# Patient Record
Sex: FEMALE | Race: WHITE | NOT HISPANIC OR LATINO | Employment: OTHER | URBAN - METROPOLITAN AREA
[De-identification: names, ages, dates, MRNs, and addresses within clinical notes are randomized per-mention and may not be internally consistent; named-entity substitution may affect disease eponyms.]

---

## 2020-08-03 ENCOUNTER — OFFICE VISIT (OUTPATIENT)
Dept: OBSTETRICS AND GYNECOLOGY | Facility: CLINIC | Age: 65
End: 2020-08-03
Payer: COMMERCIAL

## 2020-08-03 VITALS — WEIGHT: 150 LBS | SYSTOLIC BLOOD PRESSURE: 138 MMHG | DIASTOLIC BLOOD PRESSURE: 81 MMHG | HEART RATE: 52 BPM

## 2020-08-03 DIAGNOSIS — Z12.31 BREAST CANCER SCREENING BY MAMMOGRAM: ICD-10-CM

## 2020-08-03 DIAGNOSIS — Z01.419 WELL WOMAN EXAM: Primary | ICD-10-CM

## 2020-08-03 PROCEDURE — 99396 PREV VISIT EST AGE 40-64: CPT | Mod: 25,S$GLB,, | Performed by: OBSTETRICS & GYNECOLOGY

## 2020-08-03 PROCEDURE — 82274 ASSAY TEST FOR BLOOD FECAL: CPT | Mod: QW,S$GLB,, | Performed by: OBSTETRICS & GYNECOLOGY

## 2020-08-03 PROCEDURE — 82274 PR  BLOOD,OCCULT,FECAL HGB,FECES,1-3 SIMULT: ICD-10-PCS | Mod: QW,S$GLB,, | Performed by: OBSTETRICS & GYNECOLOGY

## 2020-08-03 PROCEDURE — 99396 PR PREVENTIVE VISIT,EST,40-64: ICD-10-PCS | Mod: 25,S$GLB,, | Performed by: OBSTETRICS & GYNECOLOGY

## 2020-08-03 RX ORDER — ATORVASTATIN CALCIUM 20 MG/1
TABLET, FILM COATED ORAL
COMMUNITY
Start: 2020-07-23 | End: 2022-08-09

## 2020-08-03 RX ORDER — ESCITALOPRAM OXALATE 20 MG/1
TABLET ORAL
COMMUNITY
Start: 2020-07-21 | End: 2021-08-09 | Stop reason: SDUPTHER

## 2020-08-03 RX ORDER — CALCIUM CITRATE/VITAMIN D3 200MG-6.25
TABLET ORAL
COMMUNITY
Start: 2020-07-25

## 2020-08-03 RX ORDER — CLOBETASOL PROPIONATE 0.5 MG/G
OINTMENT TOPICAL
COMMUNITY
Start: 2018-09-25 | End: 2022-12-29

## 2020-08-03 RX ORDER — SITAGLIPTIN AND METFORMIN HYDROCHLORIDE 1000; 50 MG/1; MG/1
TABLET, FILM COATED, EXTENDED RELEASE ORAL
COMMUNITY
Start: 2018-10-27 | End: 2022-08-09

## 2020-08-03 RX ORDER — DICYCLOMINE HYDROCHLORIDE 20 MG/1
TABLET ORAL
COMMUNITY
Start: 2020-07-14 | End: 2022-08-09

## 2020-08-03 RX ORDER — GLIMEPIRIDE 2 MG/1
TABLET ORAL
COMMUNITY
Start: 2020-07-14 | End: 2022-08-09

## 2020-08-03 RX ORDER — ESTRADIOL AND NORETHINDRONE ACETATE 1; .5 MG/1; MG/1
TABLET ORAL
COMMUNITY
Start: 2020-06-10 | End: 2021-08-09

## 2020-08-03 RX ORDER — ESTRADIOL 0.5 MG/1
0.5 TABLET ORAL DAILY
Qty: 90 TABLET | Refills: 3 | Status: SHIPPED | OUTPATIENT
Start: 2020-08-03 | End: 2021-07-14

## 2020-08-03 RX ORDER — PROGESTERONE 100 MG/1
100 CAPSULE ORAL NIGHTLY
Qty: 90 CAPSULE | Refills: 3 | Status: SHIPPED | OUTPATIENT
Start: 2020-08-03 | End: 2021-07-14

## 2020-08-03 RX ORDER — LEVOTHYROXINE SODIUM 75 UG/1
TABLET ORAL
COMMUNITY
Start: 2020-04-27 | End: 2022-08-09

## 2021-05-12 ENCOUNTER — PATIENT MESSAGE (OUTPATIENT)
Dept: RESEARCH | Facility: HOSPITAL | Age: 66
End: 2021-05-12

## 2021-08-09 ENCOUNTER — TELEPHONE (OUTPATIENT)
Dept: OBSTETRICS AND GYNECOLOGY | Facility: CLINIC | Age: 66
End: 2021-08-09

## 2021-08-09 ENCOUNTER — OFFICE VISIT (OUTPATIENT)
Dept: OBSTETRICS AND GYNECOLOGY | Facility: CLINIC | Age: 66
End: 2021-08-09
Payer: MEDICARE

## 2021-08-09 VITALS
DIASTOLIC BLOOD PRESSURE: 92 MMHG | HEIGHT: 65 IN | SYSTOLIC BLOOD PRESSURE: 169 MMHG | HEART RATE: 52 BPM | BODY MASS INDEX: 24.83 KG/M2 | WEIGHT: 149 LBS

## 2021-08-09 DIAGNOSIS — Z01.419 WELL WOMAN EXAM WITH ROUTINE GYNECOLOGICAL EXAM: Primary | ICD-10-CM

## 2021-08-09 DIAGNOSIS — Z12.31 BREAST CANCER SCREENING BY MAMMOGRAM: ICD-10-CM

## 2021-08-09 DIAGNOSIS — N39.41 URGENCY INCONTINENCE: ICD-10-CM

## 2021-08-09 PROCEDURE — G0101 PR CA SCREEN;PELVIC/BREAST EXAM: ICD-10-PCS | Mod: S$GLB,,, | Performed by: OBSTETRICS & GYNECOLOGY

## 2021-08-09 PROCEDURE — 82274 ASSAY TEST FOR BLOOD FECAL: CPT | Mod: QW,S$GLB,, | Performed by: OBSTETRICS & GYNECOLOGY

## 2021-08-09 PROCEDURE — G0101 CA SCREEN;PELVIC/BREAST EXAM: HCPCS | Mod: S$GLB,,, | Performed by: OBSTETRICS & GYNECOLOGY

## 2021-08-09 PROCEDURE — 82274 PR  BLOOD,OCCULT,FECAL HGB,FECES,1-3 SIMULT: ICD-10-PCS | Mod: QW,S$GLB,, | Performed by: OBSTETRICS & GYNECOLOGY

## 2021-08-09 RX ORDER — ESTRADIOL 0.5 MG/1
0.5 TABLET ORAL DAILY
Qty: 90 TABLET | Refills: 3 | Status: SHIPPED | OUTPATIENT
Start: 2021-08-09 | End: 2022-12-29

## 2021-08-09 RX ORDER — PROGESTERONE 100 MG/1
100 CAPSULE ORAL NIGHTLY
Qty: 90 CAPSULE | Refills: 3 | Status: SHIPPED | OUTPATIENT
Start: 2021-08-09 | End: 2022-08-09

## 2021-08-09 RX ORDER — MIRABEGRON 50 MG/1
50 TABLET, FILM COATED, EXTENDED RELEASE ORAL DAILY
Qty: 90 TABLET | Refills: 3 | Status: SHIPPED | OUTPATIENT
Start: 2021-08-09 | End: 2021-08-11 | Stop reason: SDUPTHER

## 2021-08-09 RX ORDER — ESCITALOPRAM OXALATE 20 MG/1
20 TABLET ORAL DAILY
Qty: 90 TABLET | Refills: 3 | Status: SHIPPED | OUTPATIENT
Start: 2021-08-09

## 2021-08-11 ENCOUNTER — PATIENT MESSAGE (OUTPATIENT)
Dept: OBSTETRICS AND GYNECOLOGY | Facility: CLINIC | Age: 66
End: 2021-08-11

## 2021-08-11 DIAGNOSIS — N39.41 URGENCY INCONTINENCE: Primary | ICD-10-CM

## 2021-08-11 RX ORDER — MIRABEGRON 50 MG/1
50 TABLET, FILM COATED, EXTENDED RELEASE ORAL DAILY
Qty: 90 TABLET | Refills: 3 | Status: SHIPPED | OUTPATIENT
Start: 2021-08-11 | End: 2022-08-09

## 2021-09-02 ENCOUNTER — PATIENT MESSAGE (OUTPATIENT)
Dept: OBSTETRICS AND GYNECOLOGY | Facility: CLINIC | Age: 66
End: 2021-09-02

## 2021-09-03 ENCOUNTER — PATIENT MESSAGE (OUTPATIENT)
Dept: OBSTETRICS AND GYNECOLOGY | Facility: CLINIC | Age: 66
End: 2021-09-03

## 2021-09-03 ENCOUNTER — TELEPHONE (OUTPATIENT)
Dept: OBSTETRICS AND GYNECOLOGY | Facility: CLINIC | Age: 66
End: 2021-09-03

## 2021-09-07 ENCOUNTER — TELEPHONE (OUTPATIENT)
Dept: OBSTETRICS AND GYNECOLOGY | Facility: CLINIC | Age: 66
End: 2021-09-07

## 2021-09-07 ENCOUNTER — PATIENT MESSAGE (OUTPATIENT)
Dept: OBSTETRICS AND GYNECOLOGY | Facility: CLINIC | Age: 66
End: 2021-09-07

## 2021-09-07 DIAGNOSIS — N39.41 URGENCY INCONTINENCE: Primary | ICD-10-CM

## 2021-09-22 ENCOUNTER — PATIENT MESSAGE (OUTPATIENT)
Dept: OBSTETRICS AND GYNECOLOGY | Facility: CLINIC | Age: 66
End: 2021-09-22

## 2021-09-22 DIAGNOSIS — N39.41 URGENCY INCONTINENCE: Primary | ICD-10-CM

## 2021-09-22 RX ORDER — OXYBUTYNIN CHLORIDE 5 MG/1
5 TABLET, EXTENDED RELEASE ORAL DAILY
Qty: 30 TABLET | Refills: 11 | Status: SHIPPED | OUTPATIENT
Start: 2021-09-22 | End: 2022-08-09

## 2021-11-20 ENCOUNTER — PATIENT MESSAGE (OUTPATIENT)
Dept: OBSTETRICS AND GYNECOLOGY | Facility: CLINIC | Age: 66
End: 2021-11-20
Payer: MEDICARE

## 2021-11-24 DIAGNOSIS — N39.41 URGENCY INCONTINENCE: Primary | ICD-10-CM

## 2021-11-24 RX ORDER — SOLIFENACIN SUCCINATE 10 MG/1
10 TABLET, FILM COATED ORAL DAILY
Qty: 30 TABLET | Refills: 2 | Status: SHIPPED | OUTPATIENT
Start: 2021-11-24 | End: 2022-08-09

## 2022-07-29 ENCOUNTER — TELEPHONE (OUTPATIENT)
Dept: GASTROENTEROLOGY | Facility: CLINIC | Age: 67
End: 2022-07-29
Payer: MEDICARE

## 2022-07-29 NOTE — TELEPHONE ENCOUNTER
----- Message from Nilda Oliver MA sent at 7/28/2022  3:11 PM CDT -----    ----- Message -----  From: Chen Nevarez  Sent: 7/28/2022   1:48 PM CDT  To: Tae LEBLANC Staff    Pt is calling in regards to seeing if Zofran and Phenergan can be called in to the pharmacy. Pt states that she was admitted in the hospital in [place] and was release today.       [facility] in [place]   [phone](P)      Pt can be reached at [phone] (home)

## 2022-07-29 NOTE — TELEPHONE ENCOUNTER
Dr. Strong typically does not prescribe phenergan. She is scheduled for an appointment 08/09.  Will send to Dr. Strong for recommendations/approval, but she may want to see the records from the hospital in [place].  VAMSHI, CMA

## 2022-08-01 NOTE — TELEPHONE ENCOUNTER
I will need to see/evaluate the patient and her records. Have her bring all of those records with her to her OV with me.  CHINO

## 2022-08-09 ENCOUNTER — OFFICE VISIT (OUTPATIENT)
Dept: GASTROENTEROLOGY | Facility: CLINIC | Age: 67
End: 2022-08-09
Payer: MEDICARE

## 2022-08-09 VITALS
DIASTOLIC BLOOD PRESSURE: 74 MMHG | HEART RATE: 74 BPM | BODY MASS INDEX: 22.66 KG/M2 | SYSTOLIC BLOOD PRESSURE: 117 MMHG | WEIGHT: 136 LBS | HEIGHT: 65 IN

## 2022-08-09 DIAGNOSIS — K58.0 IRRITABLE BOWEL SYNDROME WITH DIARRHEA: ICD-10-CM

## 2022-08-09 DIAGNOSIS — R19.7 DIARRHEA, UNSPECIFIED TYPE: Primary | ICD-10-CM

## 2022-08-09 DIAGNOSIS — R93.3 ABNORMAL FINDING ON GI TRACT IMAGING: ICD-10-CM

## 2022-08-09 DIAGNOSIS — R74.01 ELEVATED AST (SGOT): ICD-10-CM

## 2022-08-09 DIAGNOSIS — K92.1 BLOOD IN STOOL: ICD-10-CM

## 2022-08-09 DIAGNOSIS — D72.829 LEUKOCYTOSIS, UNSPECIFIED TYPE: ICD-10-CM

## 2022-08-09 PROCEDURE — 99215 OFFICE O/P EST HI 40 MIN: CPT | Mod: S$GLB,,, | Performed by: INTERNAL MEDICINE

## 2022-08-09 PROCEDURE — 99215 PR OFFICE/OUTPT VISIT, EST, LEVL V, 40-54 MIN: ICD-10-PCS | Mod: S$GLB,,, | Performed by: INTERNAL MEDICINE

## 2022-08-09 RX ORDER — LEVOTHYROXINE SODIUM 75 UG/1
75 TABLET ORAL
COMMUNITY
Start: 2022-07-18 | End: 2023-08-09

## 2022-08-09 RX ORDER — ATORVASTATIN CALCIUM 20 MG/1
40 TABLET, FILM COATED ORAL
COMMUNITY
Start: 2022-07-05 | End: 2022-12-29

## 2022-08-09 RX ORDER — DAPAGLIFLOZIN 10 MG/1
TABLET, FILM COATED ORAL
COMMUNITY
Start: 2022-05-04

## 2022-08-09 RX ORDER — SITAGLIPTIN AND METFORMIN HYDROCHLORIDE 1000; 50 MG/1; MG/1
1 TABLET, FILM COATED, EXTENDED RELEASE ORAL 2 TIMES DAILY
COMMUNITY
Start: 2022-07-05

## 2022-08-09 RX ORDER — GLIMEPIRIDE 4 MG/1
4 TABLET ORAL 2 TIMES DAILY
COMMUNITY
Start: 2022-04-14

## 2022-08-09 RX ORDER — PROGESTERONE 100 MG/1
100 CAPSULE ORAL
COMMUNITY
Start: 2022-07-17 | End: 2022-12-29

## 2022-08-09 RX ORDER — SOD SULF/POT CHLORIDE/MAG SULF 1.479 G
12 TABLET ORAL DAILY
Qty: 24 TABLET | Refills: 0 | Status: SHIPPED | OUTPATIENT
Start: 2022-08-09 | End: 2022-10-17

## 2022-08-09 RX ORDER — DICYCLOMINE HYDROCHLORIDE 20 MG/1
20 TABLET ORAL
COMMUNITY
Start: 2022-06-16 | End: 2022-11-10 | Stop reason: SDUPTHER

## 2022-08-09 RX ORDER — MULTIVITAMIN
1 TABLET ORAL
COMMUNITY

## 2022-08-09 RX ORDER — LANOLIN ALCOHOL/MO/W.PET/CERES
1000 CREAM (GRAM) TOPICAL
COMMUNITY
End: 2022-12-29

## 2022-08-09 NOTE — LETTER
August 9, 2022        Andrew Meneses MD  [facility] [address] [address]             [place] [facility]  [address] [place] [address]  Phone: [phone]  Fax: [phone]   Patient: Luzmaria Grossman   MR Number: [id number]   YOB: 1955   Date of Visit: 8/9/2022       Dear Dr. Meneses:    Thank you for referring Luzmaria Grossman to me for evaluation. Attached you will find relevant portions of my assessment and plan of care.    If you have questions, please do not hesitate to call me. I look forward to following Luzmaria Grossman along with you.    Sincerely,      Lizzeth Strong MD            CC  No Recipients    Enclosure

## 2022-08-09 NOTE — PATIENT INSTRUCTIONS
Schedule colonoscopy.  Get me a copy of your recent blood tests.  Notify me sooner if your symptoms worsen.  You may liberate your diet.    Please notify my office if you have not been contacted within two weeks after any procedures, submitting any samples (biopsies, blood, stool, urine, etc.) or after any imaging (X-ray, CT, MRI, etc.).

## 2022-08-09 NOTE — PROGRESS NOTES
Clinic Note    Reason for visit:  The primary encounter diagnosis was Diarrhea, unspecified type. Diagnoses of Irritable bowel syndrome with diarrhea, Blood in stool, Leukocytosis, unspecified type, Elevated AST (SGOT), and Abnormal finding on GI tract imaging were also pertinent to this visit.    PCP: Andrew Meneses       HPI:  This is a 66 y.o. female who is being seen for a follow up visit.     Was in [place] to help with her mother when she had acute onset vomiting and diarrhea.  She went to an urgent care was found to have low glucose at 64. She was sent to Emergency Department with a did blood work and a CT scan.  She had mildly elevated white blood cell count that was mostly neutrophils.  Her AST was minimally elevated in just above normal range.  She did not have a bowel movement when she was in the emergency department for the stool orders.  She had a CT scan of the abdomen pelvis with IV contrast that showed some sub mucosal thickening of the ascending colon and fluid-filled appendix.  They favored inflammatory versus infectious source.  She was put on a low residue diet.  She will have 3-4 bowel movements in the morning and take Kaopectate which will help with the diarrhea.  No more vomiting.  She is seeing some blood in her stool.  Medium red blood with no clots appreciated.  Wanted a burger last night.  She does get some lower mid abdominal cramping prior to the bowel movements and now primarily in the morning given the above.    Last colonoscopy 10/3/2017: mild diverticulosis of whole colon, IH, repeat colonoscopy 2024      Change in bowel habits: no coffee, walks in the morning. May be at her new normal with pelvic floor weakness/dysfunction. Bentyl 20 BID. Fat in stool --> stopped oil supp. Creon samples did not help. Shoulder PT referral by PCP then can use pelvic PT. Failed BA binder in the past.   10/14/2014      Chronic diarrhea     Irritable bowel syndrome with diarrhea: trial of Bentyl did  not help much, rifaximin #1 helped greatly, lost effect after 4 months.       Screening for malignant neoplasms of colon: average risk, next due 2024    Review of Systems   Constitutional: Negative for chills, diaphoresis, fatigue, fever and unexpected weight change.   HENT: Negative for mouth sores, nosebleeds, postnasal drip, sore throat, trouble swallowing and voice change.    Eyes: Negative for pain, discharge and eye dryness.   Respiratory: Negative for apnea, cough, choking, chest tightness, shortness of breath and wheezing.    Cardiovascular: Negative for chest pain, palpitations, leg swelling and claudication.   Gastrointestinal: Positive for abdominal pain, blood in stool, change in bowel habit, diarrhea, nausea, vomiting, fecal incontinence and change in bowel habit. Negative for abdominal distention, anal bleeding, constipation, rectal pain and reflux.   Genitourinary: Negative for bladder incontinence, difficulty urinating, dysuria, flank pain, frequency and hematuria.   Musculoskeletal: Negative for arthralgias, back pain, joint swelling and joint deformity.   Integumentary:  Negative for color change, rash and wound.   Allergic/Immunologic: Negative for environmental allergies and food allergies.   Neurological: Negative for seizures, facial asymmetry, speech difficulty, weakness, headaches and memory loss.   Hematological: Negative for adenopathy. Does not bruise/bleed easily.   Psychiatric/Behavioral: Negative for agitation, behavioral problems, confusion, hallucinations and sleep disturbance.      Past Medical History:   Diagnosis Date    At high risk for breast cancer     Diabetes mellitus     History of migraine headaches     History of UTI     Hormone replacement therapy     Hypercholesterolemia     Hypothyroid     Left ovarian cyst     Menopause     Osteopenia     PMB (postmenopausal bleeding)     HANS (stress urinary incontinence, female)     Urinary retention      Past Surgical  History:   Procedure Laterality Date    DILATION AND CURETTAGE OF UTERUS      PTERYGIUM Left     TONSILLECTOMY AND ADENOIDECTOMY      TUBAL LIGATION       Family History   Problem Relation Age of Onset    Breast cancer Mother     Hypertension Father     Breast cancer Other     No Known Problems Sister     No Known Problems Brother     No Known Problems Maternal Grandmother     No Known Problems Maternal Grandfather     No Known Problems Paternal Grandmother     No Known Problems Paternal Grandfather      Social History     Tobacco Use    Smoking status: Former Smoker    Smokeless tobacco: Never Used    Tobacco comment: STOP SMOKING AT AGE 30    Substance Use Topics    Alcohol use: Yes     Comment: OCASSIONAL     Drug use: Never     Review of patient's allergies indicates:  No Known Allergies     Medication List with Changes/Refills   New Medications    SOD SULF-POT CHLORIDE-MAG SULF (SUTAB) 1.479-0.188- 0.225 GRAM TABLET    Take 12 tablets by mouth once daily. Take according to package instructions with indicated amount of water. No breakfast day before test. May substitute with Suprep, Clenpiq, Plenvu, Moviprep or GoLytely based on Rx plan and patient preference.   Current Medications    ATORVASTATIN (LIPITOR) 20 MG TABLET    Take 20 mg by mouth.    CALCIUM-VITAMIN D 600 MG-10 MCG (400 UNIT) TAB    Take 1 tablet by mouth.    CLOBETASOL 0.05% (TEMOVATE) 0.05 % OINT    clobetasol 0.05 % topical ointment    CYANOCOBALAMIN (VITAMIN B-12) 1000 MCG TABLET    Take 1,000 mcg by mouth.    DAPAGLIFLOZIN (FARXIGA) 10 MG TABLET        DICYCLOMINE (BENTYL) 20 MG TABLET    Take 20 mg by mouth.    ESCITALOPRAM OXALATE (LEXAPRO) 20 MG TABLET    Take 1 tablet (20 mg total) by mouth once daily.    ESTRADIOL (ESTRACE) 0.5 MG TABLET    Take 1 tablet (0.5 mg total) by mouth once daily.    GLIMEPIRIDE (AMARYL) 4 MG TABLET    Take 4 mg by mouth 2 (two) times daily.    LEVOTHYROXINE (SYNTHROID) 75 MCG TABLET    Take 75  "mcg by mouth.    PROGESTERONE (PROMETRIUM) 100 MG CAPSULE    Take 100 mg by mouth.    SITAGLIPTAN-METFORMIN (JANUMET XR) 50-1,000 MG TM24    Take 1 tablet by mouth 2 (two) times daily.    TRUE METRIX GLUCOSE TEST STRIP STRP       Discontinued Medications    ATORVASTATIN (LIPITOR) 20 MG TABLET        DICYCLOMINE (BENTYL) 20 MG TABLET        GLIMEPIRIDE (AMARYL) 2 MG TABLET        LEVOTHYROXINE (SYNTHROID) 75 MCG TABLET        MIRABEGRON (MYRBETRIQ) 50 MG TB24    Take 1 tablet (50 mg total) by mouth once daily.    OXYBUTYNIN (DITROPAN-XL) 5 MG TR24    Take 1 tablet (5 mg total) by mouth once daily.    PROGESTERONE (PROMETRIUM) 100 MG CAPSULE    Take 1 capsule (100 mg total) by mouth every evening.    SITAGLIPTAN-METFORMIN (JANUMET XR) 50-1,000 MG TM24    Janumet XR 50 mg-1,000 mg tablet,extended release    SOLIFENACIN (VESICARE) 10 MG TABLET    Take 1 tablet (10 mg total) by mouth once daily.         Vital Signs:  /74   Pulse 74   Ht 5' 5" (1.651 m)   Wt 61.7 kg (136 lb)   BMI 22.63 kg/m²         Physical Exam  Vitals reviewed.   Constitutional:       General: She is awake. She is not in acute distress.     Appearance: Normal appearance. She is well-developed. She is not ill-appearing, toxic-appearing or diaphoretic.   HENT:      Head: Normocephalic and atraumatic.      Nose: Nose normal.      Mouth/Throat:      Mouth: Mucous membranes are moist.      Pharynx: Oropharynx is clear. No oropharyngeal exudate or posterior oropharyngeal erythema.   Eyes:      General: Lids are normal. Gaze aligned appropriately. No scleral icterus.        Right eye: No discharge.         Left eye: No discharge.      Extraocular Movements: Extraocular movements intact.      Conjunctiva/sclera: Conjunctivae normal.   Neck:      Trachea: Trachea normal.   Cardiovascular:      Rate and Rhythm: Normal rate and regular rhythm.      Pulses:           Radial pulses are 2+ on the right side and 2+ on the left side.   Pulmonary:      " Effort: Pulmonary effort is normal. No respiratory distress.      Breath sounds: Normal breath sounds. No stridor. No wheezing or rhonchi.   Chest:      Chest wall: No tenderness.   Abdominal:      General: Bowel sounds are normal. There is no distension.      Palpations: Abdomen is soft. There is no fluid wave, hepatomegaly or mass.      Tenderness: There is no abdominal tenderness. There is no guarding or rebound.   Musculoskeletal:         General: No tenderness or deformity.      Cervical back: Full passive range of motion without pain and neck supple. No tenderness.      Right lower leg: No edema.      Left lower leg: No edema.   Lymphadenopathy:      Cervical: No cervical adenopathy.   Skin:     General: Skin is warm and dry.      Capillary Refill: Capillary refill takes less than 2 seconds.      Coloration: Skin is not cyanotic, jaundiced or pale.      Findings: No rash.   Neurological:      General: No focal deficit present.      Mental Status: She is alert and oriented to person, place, and time.      Cranial Nerves: No facial asymmetry.      Motor: No tremor.   Psychiatric:         Attention and Perception: Attention normal.         Mood and Affect: Mood and affect normal.         Speech: Speech normal.         Behavior: Behavior normal. Behavior is cooperative.            All of the data above and below has been reviewed by myself and any further interpretations will be reflected in the assessment and plan.   The data includes review of external notes, and independent interpretation of lab results, procedures, x-rays, and imaging reports.      Assessment:  Diarrhea, unspecified type  -     Cancel: Ambulatory Referral to External Surgery  -     Ambulatory Referral to External Surgery    Irritable bowel syndrome with diarrhea  -     Cancel: Ambulatory Referral to External Surgery    Blood in stool  -     Cancel: Ambulatory Referral to External Surgery  -     Ambulatory Referral to External  Surgery    Leukocytosis, unspecified type    Elevated AST (SGOT)    Abnormal finding on GI tract imaging  -     Ambulatory Referral to External Surgery    Other orders  -     sod sulf-pot chloride-mag sulf (SUTAB) 1.479-0.188- 0.225 gram tablet; Take 12 tablets by mouth once daily. Take according to package instructions with indicated amount of water. No breakfast day before test. May substitute with Suprep, Clenpiq, Plenvu, Moviprep or GoLytely based on Rx plan and patient preference.  Dispense: 24 tablet; Refill: 0      Her abnormal blood test for likely related to her acute process at that time.  It sounds as if she had a gastroenteritis/colitis that was primarily infectious.  Liberate her diet as tolerate food.  She had recent blood test and she will get me a copy of those results.  If the CBC and LFT were not checked on those and we will order.  In the meantime we will plan to schedule and non urgent colonoscopy with the goal of allowing her GI tract to heal completely long she continues to clinically improve.  I suspect she has residual GI symptoms related to her baseline irritable bowel syndrome.    Recommendations:  Schedule colonoscopy.  Get me a copy of your recent blood tests.  Notify me sooner if your symptoms worsen.  You may liberate your diet.    Risks, benefits, and alternatives of medical management, any associated procedures, and/or treatment discussed with the patient. Patient given opportunity to ask questions and voices understanding. Patient has elected to proceed with the recommended care modalities as discussed.    Follow up in about 1 year (around 8/9/2023).    Order summary:  Orders Placed This Encounter   Procedures    Ambulatory Referral to External Surgery        Instructed patient to notify my office if they have not been contacted within two weeks after any procedures, submitting any samples (biopsies, blood, stool, urine, etc.) or after any imaging (X-ray, CT, MRI, etc.).     Lizzeth  MD Tae    This document may have been created using a voice recognition transcribing system. Incorrect words or phrases may have been missed during proofreading. Please interpret accordingly or contact me for clarification.

## 2022-08-12 ENCOUNTER — TELEPHONE (OUTPATIENT)
Dept: GASTROENTEROLOGY | Facility: CLINIC | Age: 67
End: 2022-08-12
Payer: MEDICARE

## 2022-08-12 NOTE — TELEPHONE ENCOUNTER
----- Message from Mikaela Mullen sent at 8/12/2022  8:46 AM CDT -----  Regarding: pt advice  Contact: Pt  Pt is calling to verify that lab results were received that she had dropped off. Please call back at [phone]//thank you acc

## 2022-08-19 ENCOUNTER — TELEPHONE (OUTPATIENT)
Dept: GASTROENTEROLOGY | Facility: CLINIC | Age: 67
End: 2022-08-19
Payer: MEDICARE

## 2022-08-19 NOTE — TELEPHONE ENCOUNTER
8/4/2022 CMP wnl, 7.92/11.2L/427H, MCV 91. AST 28.4.    Notify patient that I reviewed her lab results from a few weeks ago. Her liver tests were normal. She was mildly anemic as she was prior to that but it is stable. No significant changes in her plan. Add anemia to her history.  NBP

## 2022-08-19 NOTE — TELEPHONE ENCOUNTER
Results and recommendations discussed with patient, who voices understanding and agreement. They will contact us with any issues.   PRITIL, ILYA

## 2022-09-26 ENCOUNTER — OFFICE VISIT (OUTPATIENT)
Dept: OBSTETRICS AND GYNECOLOGY | Facility: CLINIC | Age: 67
End: 2022-09-26
Payer: MEDICARE

## 2022-09-26 VITALS
BODY MASS INDEX: 22.63 KG/M2 | HEART RATE: 80 BPM | SYSTOLIC BLOOD PRESSURE: 122 MMHG | WEIGHT: 136 LBS | DIASTOLIC BLOOD PRESSURE: 77 MMHG

## 2022-09-26 DIAGNOSIS — Z12.31 BREAST CANCER SCREENING BY MAMMOGRAM: ICD-10-CM

## 2022-09-26 DIAGNOSIS — Z01.419 WELL WOMAN EXAM WITH ROUTINE GYNECOLOGICAL EXAM: Primary | ICD-10-CM

## 2022-09-26 PROCEDURE — G0101 CA SCREEN;PELVIC/BREAST EXAM: HCPCS | Mod: S$GLB,,, | Performed by: OBSTETRICS & GYNECOLOGY

## 2022-09-26 PROCEDURE — G0101 PR CA SCREEN;PELVIC/BREAST EXAM: ICD-10-PCS | Mod: S$GLB,,, | Performed by: OBSTETRICS & GYNECOLOGY

## 2022-09-27 LAB — Lab: NORMAL

## 2022-09-27 NOTE — PROGRESS NOTES
Subjective:       Patient ID: Luzmaria Grossman is a 67 y.o. female.    Chief Complaint:  Well Woman      History of Present Illness  She is doing well.  No new health issues,  No abnormal bleeding, No GI or Gu concerns,  No dyspareunia.   Her  had MI   she too has a CA score that is high   she is to have a cardiology apt.   She is having difficulty with intercourse and desire      no vag bleeding   HPI    Outpatient Medications Marked as Taking for the 22 encounter (Office Visit) with Oscar Lundberg III, MD   Medication Sig Dispense Refill    atorvastatin (LIPITOR) 20 MG tablet Take 40 mg by mouth.      calcium-vitamin D 600 mg-10 mcg (400 unit) Tab Take 1 tablet by mouth.      clobetasol 0.05% (TEMOVATE) 0.05 % Oint clobetasol 0.05 % topical ointment      cyanocobalamin (VITAMIN B-12) 1000 MCG tablet Take 1,000 mcg by mouth.      dapagliflozin (FARXIGA) 10 mg tablet       dicyclomine (BENTYL) 20 mg tablet Take 20 mg by mouth.      EScitalopram oxalate (LEXAPRO) 20 MG tablet Take 1 tablet (20 mg total) by mouth once daily. 90 tablet 3    estradioL (ESTRACE) 0.5 MG tablet Take 1 tablet (0.5 mg total) by mouth once daily. 90 tablet 3    glimepiride (AMARYL) 4 MG tablet Take 4 mg by mouth 2 (two) times daily.      levothyroxine (SYNTHROID) 75 MCG tablet Take 75 mcg by mouth.      progesterone (PROMETRIUM) 100 MG capsule Take 100 mg by mouth.      SITagliptan-metformin (JANUMET XR) 50-1,000 mg TM24 Take 1 tablet by mouth 2 (two) times daily.        GYN & OB History  No LMP recorded. Patient is postmenopausal.     Date of Last Pap: No result found    OB History    Para Term  AB Living   4 2           SAB IAB Ectopic Multiple Live Births                  # Outcome Date GA Lbr Eric/2nd Weight Sex Delivery Anes PTL Lv   4             3             2 Para            1 Para                Review of Systems  Review of Systems   Constitutional:  Negative for activity change.   Eyes:   Negative for visual disturbance.   Respiratory:  Negative for shortness of breath.    Cardiovascular:  Negative for chest pain.   Gastrointestinal:  Negative for abdominal pain.   Genitourinary:  Negative for vaginal bleeding.        No abnormal vaginal bleeding   Musculoskeletal:  Negative for back pain.   Integumentary:  Negative for rash and breast mass.   Neurological:  Negative for numbness.   Psychiatric/Behavioral:          No mood disturbance or changes    Breast: Negative for mass          Objective:    Physical Exam:   Constitutional: She is oriented to person, place, and time. She appears well-developed. She is cooperative.    HENT:   Head: Normocephalic.     Neck: Trachea normal. No thyromegaly present.    Cardiovascular:  Normal rate, regular rhythm and normal heart sounds.             Pulmonary/Chest: Effort normal and breath sounds normal. Right breast exhibits no mass, no nipple discharge and no skin change. Left breast exhibits no mass, no nipple discharge and no skin change.   Bilateral fibrocystic changes  During the exam self breast exams discussed         Abdominal: Soft. There is no abdominal tenderness. There is no rebound and no guarding.     Genitourinary:    Vagina, uterus and rectum normal.   Rectum:      Guaiac result negative.   Guaiac negative stool.    Pelvic exam was performed with patient supine.   Labial bartholins normal.There is no lesion on the right labia. There is no lesion on the left labia. Cervix is normal. Right adnexum displays no mass and no tenderness. Left adnexum displays no mass and no tenderness. Cervix exhibits no discharge. Also,  pap smear completed  Uterus is not enlarged and not tender.              Lymphadenopathy:        Head (right side): No submental and no submandibular adenopathy present.        Head (left side): No submental and no submandibular adenopathy present.     She has no cervical adenopathy.    Neurological: She is alert and oriented to person,  place, and time.    Skin: Skin is warm.    Psychiatric: She has a normal mood and affect. Her speech is normal and behavior is normal. Thought content normal.        Assessment:        1. Well woman exam with routine gynecological exam    2. Breast cancer screening by mammogram               Plan:         Discussed that HRT can increase the risk of cardiovascular events and CVA.    She will start an ASA per day.   Would consider stopping her HRT  if needed will try non hormonal meds.   She is aware  of meds to help with desire    bonified meds discussed to help that are non hormonal     Pap   Mammogram  Follow up one year or sooner if needed  Self breast exams  Consider health profile if labs not done with PCP  Recommend colonoscopy as indicated  Bone density discussed   Pt is aware we call all results. If she does not hear from our office regarding her result within a week of having a study or procedure performed she is to call the office so that we can research the result for her as I do not know when she is scheduling her procedures.   Chaperone was present

## 2022-10-10 ENCOUNTER — TELEPHONE (OUTPATIENT)
Dept: GASTROENTEROLOGY | Facility: CLINIC | Age: 67
End: 2022-10-10
Payer: MEDICARE

## 2022-10-10 NOTE — TELEPHONE ENCOUNTER
----- Message from Mikaela Mullen sent at 10/10/2022  9:06 AM CDT -----  Regarding: pt advice  Contact: pt  Pt states that she has questions regarding her prep. Please call back at [phone]//thank you acc

## 2022-10-12 ENCOUNTER — OUTSIDE PLACE OF SERVICE (OUTPATIENT)
Dept: GASTROENTEROLOGY | Facility: CLINIC | Age: 67
End: 2022-10-12

## 2022-10-12 LAB — CRC RECOMMENDATION EXT: NORMAL

## 2022-10-12 PROCEDURE — 45380 COLONOSCOPY AND BIOPSY: CPT | Mod: ,,, | Performed by: INTERNAL MEDICINE

## 2022-10-12 PROCEDURE — 45380 PR COLONOSCOPY,BIOPSY: ICD-10-PCS | Mod: ,,, | Performed by: INTERNAL MEDICINE

## 2022-10-17 ENCOUNTER — TELEPHONE (OUTPATIENT)
Dept: GASTROENTEROLOGY | Facility: CLINIC | Age: 67
End: 2022-10-17
Payer: MEDICARE

## 2022-10-17 NOTE — TELEPHONE ENCOUNTER
CBx incr'd IEL.  Notify patient that her colon Bx look well. Keep follow up OV with me and notify me sooner if any issues.  NBP

## 2022-10-18 ENCOUNTER — PATIENT MESSAGE (OUTPATIENT)
Dept: GASTROENTEROLOGY | Facility: CLINIC | Age: 67
End: 2022-10-18
Payer: MEDICARE

## 2022-10-19 NOTE — TELEPHONE ENCOUNTER
Pt returned call and given pepto instructions of taking 2 chewable tablets BID.  I will contact her in 2 weeks to get an update on her symptoms with the pepto.

## 2022-10-19 NOTE — TELEPHONE ENCOUNTER
See patient message from 10/18/2022 encounter.  No signs of microscopic colitis on her colon Bx. There is a mild increase in some inflammation cells but not enough to be classified as microscopic colitis.  Some people find relief of loose stool with treating as if they have microscopic colitis which can started with Peptobismol (OTC, generic okay) 2 chewables starting BID. Stool may turn harmlessly black.  Send me an update in 2 weeks. May need to up to up QID. Typically an eight week course total then trial of stopping it.  NBP

## 2022-10-19 NOTE — TELEPHONE ENCOUNTER
LVM to call us back.    See patient message from 10/18/2022 encounter.  No signs of microscopic colitis on her colon Bx. There is a mild increase in some inflammation cells but not enough to be classified as microscopic colitis.  Some people find relief of loose stool with treating as if they have microscopic colitis which can started with Peptobismol (OTC, generic okay) 2 chewables starting BID. Stool may turn harmlessly black.  Send me an update in 2 weeks. May need to up to up QID. Typically an eight week course total then trial of stopping it.  NBP

## 2022-11-03 ENCOUNTER — TELEPHONE (OUTPATIENT)
Dept: GASTROENTEROLOGY | Facility: CLINIC | Age: 67
End: 2022-11-03
Payer: MEDICARE

## 2022-11-03 NOTE — TELEPHONE ENCOUNTER
I spoke with pt to get an update from her on Pepto use 2 tabs BID. Pt states that she is still having daily loose stools. I instructed pt to increase to 2 tabs QID and I will call her in a few weeks to get an update from her.

## 2022-11-03 NOTE — TELEPHONE ENCOUNTER
----- Message from Yeimy East sent at 11/3/2022  8:49 AM CDT -----  Regarding: Medication  Contact: Patient  Per phone call with patient, she would like for Diana to give her a call back regarding she spoke to you 2 weeks ago and was advised to take Pepso twice a day and you will call her back and tell her what is next.  Please return call at [phone] (home).     Thanks,  NORMA      Type:  RX Refill Request    Who Called: Luzmaria   Refill or New Rx: refill   RX Name and Strength: dicyclomine (BENTYL) 20 mg tablet  How is the patient currently taking it? (ex. 1XDay):daily   Is this a 30 day or 90 day RX: 30  Preferred Pharmacy with phone number:   [facility] [address] [place], [place] - [address] [address]  Phone: [phone] Fax: [phone]    local or Mail Order:local   Ordering Provider: Dr Strong   Would the patient rather a call back or a response via [facility]?  Call back   Best Call Back Number:[phone] (home)     Additional Information:     NORMA Kerr

## 2022-11-08 NOTE — TELEPHONE ENCOUNTER
----- Message from Yeimy Jack sent at 11/8/2022 10:34 AM CST -----  Regarding: Refill  Contact: patient  Type:  RX Refill Request    Who Called: Luzmaria   Refill or New Rx: refill   RX Name and Strength: dicyclomine (BENTYL) 20 mg tablet  How is the patient currently taking it? (ex. 1XDay):daily   Is this a 30 day or 90 day RX: 90  Preferred Pharmacy with phone number:  [facility] [address] [place], [place] - [address] [address]  Phone: [phone] Fax: [phone]    Local or Mail Order:local   Ordering Provider: Dr Strong   Would the patient rather a call back or a response via [facility]?  [facility]  Best Call Back Number: [phone] (home)     Additional Information: The pharmacy has faxed over information    NORMA Kerr

## 2022-11-09 ENCOUNTER — PATIENT MESSAGE (OUTPATIENT)
Dept: GASTROENTEROLOGY | Facility: CLINIC | Age: 67
End: 2022-11-09
Payer: MEDICARE

## 2022-11-11 RX ORDER — DICYCLOMINE HYDROCHLORIDE 20 MG/1
20 TABLET ORAL 4 TIMES DAILY PRN
Qty: 120 TABLET | Refills: 2 | OUTPATIENT
Start: 2022-11-11

## 2022-11-11 RX ORDER — DICYCLOMINE HYDROCHLORIDE 20 MG/1
20 TABLET ORAL 2 TIMES DAILY PRN
Qty: 180 TABLET | Refills: 3 | Status: SHIPPED | OUTPATIENT
Start: 2022-11-11 | End: 2023-10-20

## 2022-12-09 ENCOUNTER — TELEPHONE (OUTPATIENT)
Dept: GASTROENTEROLOGY | Facility: CLINIC | Age: 67
End: 2022-12-09
Payer: MEDICARE

## 2022-12-09 DIAGNOSIS — D64.9 ANEMIA, UNSPECIFIED TYPE: ICD-10-CM

## 2022-12-09 DIAGNOSIS — K90.9 INTESTINAL MALABSORPTION, UNSPECIFIED TYPE: ICD-10-CM

## 2022-12-09 DIAGNOSIS — R19.7 DIARRHEA, UNSPECIFIED TYPE: Primary | ICD-10-CM

## 2022-12-09 NOTE — TELEPHONE ENCOUNTER
Pt called to report that she is in week 8 of taking pepto  2 tabs QID and still is having issues.  Pt states that she is still experiencing fecal incontinence.   Pt wants to know what the next steps are.

## 2022-12-09 NOTE — TELEPHONE ENCOUNTER
----- Message from Shannon Layton RN sent at 12/9/2022 10:08 AM CST -----  Regarding: FW: Pepto Pills  Contact: patient    ----- Message -----  From: Yeimy Jack  Sent: 12/9/2022   9:49 AM CST  To: Tae [facility]BLANC Staff  Subject: Pepto Pills                                      Per phone call with patient, she stated that this is her 8 weeks to be on the pepto pills and she wanted to know the next step.  Please return call at [phone] (home).    Thanks,  SJ

## 2022-12-11 NOTE — TELEPHONE ENCOUNTER
Stop the Peptobismol. Let us get some blood and stool tests completed given the persistence of her diarrhea.  She also had mild anemia and I will send a work up on that as well. She should notify me if no word with the results within two weeks of submitting the blood and stool samples.  CHINO

## 2022-12-13 ENCOUNTER — DOCUMENTATION ONLY (OUTPATIENT)
Dept: GASTROENTEROLOGY | Facility: CLINIC | Age: 67
End: 2022-12-13
Payer: MEDICARE

## 2022-12-13 LAB
CRYPTOSPORIDIUM DNA: NOT DETECTED
GIARDIA LAMBLIA DNA: NOT DETECTED

## 2022-12-17 LAB
%TRANSFERRIN SATURATION: 4 % (ref 20–50)
ABS NRBC COUNT: 0 X 10 3/UL (ref 0–0.01)
ABSOLUTE BASOPHIL: 0.13 X 10 3/UL (ref 0–0.22)
ABSOLUTE EOSINOPHIL: 0.3 X 10 3/UL (ref 0.04–0.54)
ABSOLUTE IMMATURE GRAN: 0.03 X 10 3/UL (ref 0–0.04)
ABSOLUTE LYMPHOCYTE: 2.65 X 10 3/UL (ref 0.86–4.75)
ABSOLUTE MONOCYTE: 0.62 X 10 3/UL (ref 0.22–1.08)
ABSOLUTE RETIC: 0.09 X 10 6/UL (ref 0.02–0.08)
ADDITIONAL TESTING: NORMAL
ALBUMIN SERPL-MCNC: 4.8 G/DL (ref 3.5–5.2)
ALBUMIN/GLOB SERPL ELPH: 1.7 {RATIO} (ref 1–2.7)
ALP ISOS SERPL LEV INH-CCNC: 111 U/L (ref 35–105)
ALT (SGPT): 25 U/L (ref 0–33)
ANION GAP SERPL CALC-SCNC: 14 MMOL/L (ref 8–17)
AST SERPL-CCNC: 24 U/L (ref 0–32)
B12: 2502 PG/ML (ref 232–1245)
BASOPHILS NFR BLD: 1.3 % (ref 0.2–1.2)
BILIRUBIN, TOTAL: 0.25 MG/DL (ref 0–1.2)
BUN/CREAT SERPL: 28.7 (ref 6–20)
CALCIUM SERPL-MCNC: 10.4 MG/DL (ref 8.6–10.2)
CARBON DIOXIDE, CO2: 27 MMOL/L (ref 22–29)
CHLORIDE: 103 MMOL/L (ref 98–107)
CREAT SERPL-MCNC: 0.63 MG/DL (ref 0.5–0.9)
CRP QUALITATIVE: NEGATIVE MG/L
CRP QUANTITATIVE: <3 MG/L
EOSINOPHIL NFR BLD: 3.1 % (ref 0.7–7)
FERRITIN: 6.16 NG/ML (ref 20–288)
FOLATE SERPL-MCNC: 17.7 NG/ML (ref 5.6–45.8)
GFR ESTIMATION: 91.77
GLOBULIN: 2.9 G/DL (ref 1.5–4.5)
GLUCOSE: 132 MG/DL (ref 82–115)
HCT VFR BLD AUTO: 37.8 % (ref 37–47)
HEMOGLOBIN A1: 97.9 % (ref 96–99)
HEMOGLOBIN A2: 2.1 % (ref 1.5–3.5)
HGB BLD-MCNC: 11.7 G/DL (ref 12–16)
HGB FRACT BLD-IMP: NORMAL
IGA SERPL-MCNC: 194 MG/DL (ref 70–400)
IMMATURE GRANULOCYTES: 0.3 % (ref 0–0.5)
IRON BINDING CAPACITY: 374 UG/DL (ref 262–472)
IRON SERPL-MCNC: 15 UG/DL (ref 37–145)
LDH SERPL L TO P-CCNC: 174 U/L (ref 135–214)
LYMPHOCYTES NFR BLD: 27.4 % (ref 19.3–53.1)
MCH RBC QN AUTO: 28.1 PG (ref 27–32)
MCHC RBC AUTO-ENTMCNC: 31 G/DL (ref 32–36)
MCV RBC AUTO: 90.6 FL (ref 82–100)
MONOCYTES NFR BLD: 6.4 % (ref 4.7–12.5)
NEUTROPHILS # BLD AUTO: 5.93 X 10 3/UL (ref 2.15–7.56)
NEUTROPHILS NFR BLD: 61.5 % (ref 34–71.1)
NUCLEATED RED BLOOD CELLS: 0 /100 WBC (ref 0–0.2)
PLATELET # BLD AUTO: 391 X 10 3/UL (ref 135–400)
POTASSIUM: 4.3 MMOL/L (ref 3.5–5.1)
PROT SNV-MCNC: 7.7 G/DL (ref 6.4–8.3)
RBC # BLD AUTO: 4.17 X 10 6/UL (ref 4.2–5.4)
RDW-SD: 49.7 FL (ref 37–54)
RETICULOCYTE COUNT: 2.1 % (ref 0.5–1.7)
SED RATE (WESTERGREN): 9 MM/HR (ref 0–30)
SODIUM: 144 MMOL/L (ref 136–145)
TSH SERPL DL<=0.005 MIU/L-ACNC: 0.13 UIU/ML (ref 0.27–4.2)
TTG IGA: 0.4 U/ML
UIBC SERPL-MCNC: 359 UG/DL (ref 112–306)
UREA NITROGEN (BUN): 18.1 MG/DL (ref 8–23)
WBC # BLD: 9.66 X 10 3/UL (ref 4.3–10.8)

## 2022-12-20 LAB
CALPROTECTIN, STOOL: 77 MCG/G
FECAL FAT, QUALITATIVE: NORMAL
PANCREATIC ELASTASE 1: >500 MCG/G

## 2022-12-23 LAB
ENDOMYSIAL IGA SCREEN: NEGATIVE
HAPTOGLOBIN: 168 MG/DL (ref 43–212)
TRANSFERRIN: 326 MG/DL (ref 188–341)

## 2022-12-27 ENCOUNTER — TELEPHONE (OUTPATIENT)
Dept: GASTROENTEROLOGY | Facility: CLINIC | Age: 67
End: 2022-12-27
Payer: MEDICARE

## 2022-12-27 NOTE — TELEPHONE ENCOUNTER
----- Message from Lizzeth Strong MD sent at 12/22/2022  3:51 PM CST -----  Calpro 77B, elast/fat nl.  Giardia neg.  Hb 11.7L, MCV 90.6, CBC onl, ferr 6.16L, Fe 15L, T/LD/B12/fol/Hbelect nl, 4%L, retic 2.1H, AP 111H, CMP onl. CRP/ESR/tTG/IgA nl. TSH 0.13L.    Pending trnsfrn, hapto, endomy, gliadin. Confirm I get them.  NBP

## 2022-12-28 ENCOUNTER — TELEPHONE (OUTPATIENT)
Dept: GASTROENTEROLOGY | Facility: CLINIC | Age: 67
End: 2022-12-28
Payer: MEDICARE

## 2022-12-28 DIAGNOSIS — K58.0 IRRITABLE BOWEL SYNDROME WITH DIARRHEA: ICD-10-CM

## 2022-12-28 DIAGNOSIS — D64.9 ANEMIA, UNSPECIFIED TYPE: ICD-10-CM

## 2022-12-28 DIAGNOSIS — R19.7 DIARRHEA, UNSPECIFIED TYPE: Primary | ICD-10-CM

## 2022-12-29 ENCOUNTER — TELEPHONE (OUTPATIENT)
Dept: GASTROENTEROLOGY | Facility: CLINIC | Age: 67
End: 2022-12-29
Payer: MEDICARE

## 2022-12-29 RX ORDER — ATORVASTATIN CALCIUM 40 MG/1
TABLET, FILM COATED ORAL
COMMUNITY
Start: 2022-09-21

## 2022-12-29 RX ORDER — PROMETHAZINE HYDROCHLORIDE 12.5 MG/1
12.5 TABLET ORAL
COMMUNITY
Start: 2022-09-06 | End: 2023-08-09

## 2022-12-29 RX ORDER — CLOBETASOL PROPIONATE 0.5 MG/G
CREAM TOPICAL
COMMUNITY
Start: 2022-12-19

## 2022-12-29 NOTE — TELEPHONE ENCOUNTER
Endomy/Hapto/Trnsfrn nl.  Calpro 77B, elast/fat nl.  Giardia neg.  Hb 11.7L, MCV 90.6, CBC onl, ferr 6.16L, Fe 15L, T/LD/B12/fol/Hbelect nl, 4%L, retic 2.1H, AP 111H, CMP onl. CRP/ESR/tTG/IgA nl. TSH 0.13L.    TPL denies having the gliadin order.    Notify patient that her stool results looked well. No signs of infection or lack of digestive enzymes from the pancreas.  Her anemia is related to low iron. Her vitamin levels checked are good. No signs of Celiac disease. Her thyroid results was a bit out of range that can be seen with thyroid replacements that are being adjusted.  Send all results to her PHCP with this note. Recommend beginning iron supplementation, OTC iron BID.  May harmlessly turn her stool black. I recommend an upper endoscopy (okay to schedule).  I think it point she may need to be evaluated by a colorectal surgeon ([facility] in [place]) for anal manometry to see if her fecal incontinence is related to her anal sphincter and/or a pelvic floor gynecology evaluation ([facility] in [place]).  Let me know if she agrees to either referral.  NBP

## 2022-12-29 NOTE — TELEPHONE ENCOUNTER
----- Message from Mikaela Mullen sent at 12/29/2022  3:03 PM CST -----  Regarding: test results  Contact: pt  Type:  Test Results    Who Called:  Luzmaria Grossman   Name of Test (Lab/Mammo/Etc):  labs   Date of Test:  12/13/22  Ordering Provider:  Tae   Where the test was performed[facility]   Would the patient rather a call back or a response via [facility]?  Call back   Best Call Back Number:  [phone]  Additional Information:

## 2022-12-29 NOTE — TELEPHONE ENCOUNTER
"[facility]  [address] [place] [address]  Phone: [phone]  Fax: [phone]    History & Physical         Provider: Dr. Lizzeth Strong    Patient Name: Luzmaria HASSAN (age):1955  67 y.o.           Gender: female   Phone: [phone]     Referring Physician: Andrew Meneses     Vital Signs:   Height - 5'5"  Weight - 136 lbs  BMI -  22.7    Plan: EGD            History:      Past Medical History:   Diagnosis Date    Anemia, unspecified     At high risk for breast cancer     BMI 22.0-22.9, adult     Diabetes mellitus     History of migraine headaches     History of UTI     Hormone replacement therapy     Hypercholesterolemia     Hypothyroid     Left ovarian cyst     Menopause     Osteopenia     PMB (postmenopausal bleeding)     HANS (stress urinary incontinence, female)     Urinary retention       Past Surgical History:   Procedure Laterality Date    BLEPHAROPLASTY      COLONOSCOPY      COSMETIC SURGERY  9/8/10    Breast reduction    CYST REMOVAL Left     Ring finger    DILATION AND CURETTAGE OF UTERUS      PTERYGIUM Left     REDUCTION OF BOTH BREASTS Bilateral     thumb surgery Left     trigger finger    tonsilectomy      TUBAL LIGATION      tvto sling        Medication List with Changes/Refills   Current Medications    ATORVASTATIN (LIPITOR) 40 MG TABLET        CALCIUM-VITAMIN D 600 MG-10 MCG (400 UNIT) TAB    Take 1 tablet by mouth.    CLOBETASOL (TEMOVATE) 0.05 % CREAM    APPLY TO RASH ON BODY TWICE DAILY FOR 2 WEEKS ON, 2 WEEKS OFF AND REPEAT FOR 2 WEEKS AS NEEDED FOR FLARED. AVOID USE ON FACE UNDERARMS AND GROIN.    DAPAGLIFLOZIN (FARXIGA) 10 MG TABLET        DICYCLOMINE (BENTYL) 20 MG TABLET    Take 1 tablet (20 mg total) by mouth 2 (two) times daily as needed (abdominal cramping or diarrhea).    ESCITALOPRAM OXALATE (LEXAPRO) 20 MG TABLET    Take 1 tablet (20 mg total) by mouth once " daily.    GLIMEPIRIDE (AMARYL) 4 MG TABLET    Take 4 mg by mouth 2 (two) times daily.    LEVOTHYROXINE (SYNTHROID) 75 MCG TABLET    Take 75 mcg by mouth.    PROMETHAZINE (PHENERGAN) 12.5 MG TAB    Take 12.5 mg by mouth.    SITAGLIPTAN-METFORMIN (JANUMET XR) 50-1,000 MG TM24    Take 1 tablet by mouth 2 (two) times daily.    TRUE METRIX GLUCOSE TEST STRIP STRP       Discontinued Medications    ATORVASTATIN (LIPITOR) 20 MG TABLET    Take 40 mg by mouth.    CLOBETASOL 0.05% (TEMOVATE) 0.05 % OINT    clobetasol 0.05 % topical ointment    CYANOCOBALAMIN (VITAMIN B-12) 1000 MCG TABLET    Take 1,000 mcg by mouth.    ESTRADIOL (ESTRACE) 0.5 MG TABLET    Take 1 tablet (0.5 mg total) by mouth once daily.    PROGESTERONE (PROMETRIUM) 100 MG CAPSULE    Take 100 mg by mouth.      Review of patient's allergies indicates:  No Known Allergies   Family History   Problem Relation Age of Onset    Breast cancer Mother     Atrial fibrillation Mother     Atrial fibrillation Father     Hypertension Father     Diabetes Father     Heart disease Father     No Known Problems Sister     No Known Problems Brother     No Known Problems Maternal Grandmother     No Known Problems Maternal Grandfather     No Known Problems Paternal Grandmother     No Known Problems Paternal Grandfather       Social History     Tobacco Use    Smoking status: Former     Packs/day: 0.00     Years: 10.00     Pack years: 0.00     Types: Cigarettes     Start date: 1975     Quit date: 1986     Years since quittin.4    Smokeless tobacco: Never    Tobacco comments:     STOP SMOKING AT AGE 30    Substance Use Topics    Alcohol use: Yes     Alcohol/week: 2.0 standard drinks     Types: 2 Drinks containing 0.5 oz of alcohol per week     Comment: OCASSIONAL     Drug use: Never        Physical Examination:     General Appearance:___________________________  HEENT:  _____________________________________  Abdomen:____________________________________  Heart:________________________________________  Lungs:_______________________________________  Extremities:___________________________________  Skin:_________________________________________  Endocrine:____________________________________  Genitourinary:_________________________________  Neurological:__________________________________      Patient has been evaluated immediately prior to sedation and is medically cleared for endoscopy with IVCS as an ASA class: ______      Physician Signature: _________________________       Date: ________  Time: ________

## 2022-12-30 NOTE — TELEPHONE ENCOUNTER
I spoke with pt. EGD scheduled.  Pt wanted me to confirm if she should choose both referrals or one? She stated that she doesn't mind doing both if that is what is recommended.

## 2023-01-03 ENCOUNTER — TELEPHONE (OUTPATIENT)
Dept: GASTROENTEROLOGY | Facility: CLINIC | Age: 68
End: 2023-01-03

## 2023-01-03 NOTE — TELEPHONE ENCOUNTER
Ideally the gynecologist would complete the anal manometry as well but I cannot confirm that they do. So I say get both providers' evaluations and opinions. Will sign both referrals and may send both with records (colonoscopy and pathology reports and last OV note).  NBP

## 2023-01-05 ENCOUNTER — TELEPHONE (OUTPATIENT)
Dept: GASTROENTEROLOGY | Facility: CLINIC | Age: 68
End: 2023-01-05
Payer: MEDICARE

## 2023-01-05 NOTE — TELEPHONE ENCOUNTER
----- Message from Betty Meneses sent at 1/5/2023  3:14 PM CST -----  Contact: self  Type - Follow Up     Patient would like to speak w/clinic in regards to referrals that Dr Strong has sent out. May I have you reach out to her @ [phone] - thank you!

## 2023-01-05 NOTE — TELEPHONE ENCOUNTER
Spoke with pt, she was just wanting to see if she could make an appt with Dr. Strong to sit and discuss what she should expect to prepare herself before  she sees the doctors in [place].  She said  if Dr. Strong could just call her that would be fine also.

## 2023-01-06 NOTE — TELEPHONE ENCOUNTER
She may make a next available OV with [facility] or delay her referrals until our OV to discuss. I would recommend she at least meet with those providers as I am not sure what procedures/tests they would recommend. I would suspect an anal manometry to check the function of her rectum and anal sphincter.  The other tests by gynecology I am not familiar with as they are highly specialized which is why I recommended them.  I do know that the local gynecologists also well-respect Dr. [facility]ador's clinic.  NBP

## 2023-01-06 NOTE — TELEPHONE ENCOUNTER
Informed patient, the doctors office called her and scheduled her an appt for the end of the month.  She will keep the appt and see what they say, states she did her research and read patient comments ; she  feels comfortable seeing them . She has a follow up with our office in August.

## 2023-02-24 ENCOUNTER — TELEPHONE (OUTPATIENT)
Dept: GASTROENTEROLOGY | Facility: CLINIC | Age: 68
End: 2023-02-24
Payer: MEDICARE

## 2023-02-24 NOTE — TELEPHONE ENCOUNTER
----- Message from Pattie Eagle sent at 2/24/2023 10:32 AM CST -----  Type:  Needs Medical Advice    Who Called: Luzmaria Grossman    Symptoms (please be specific): -   How long has patient had these symptoms:  -  Pharmacy name and phone #:  -  Would the patient rather a call back or a response via [facility]? CB   Best Call Back Number: [phone]    Additional Information: Pt needas to reschedule EGD, please call

## 2023-03-07 NOTE — TELEPHONE ENCOUNTER
I spoke with pt. She stated that Dr Amador does not take her insurance/Medicare.  She has seen Dr Trinidad and stated that she is having surgery on 3/13/23 to remove her appendix and part of her cecum.  I have requested notes from Dr Trinidad's office.  Pt would like to hold off for now on GYN referral.

## 2023-03-12 NOTE — PROGRESS NOTES
3 Dr. Trinidad OV notes: 2/10/2023 pelvic floor weakness --> PT, appendiceal ?mucocele --> pending cecectomy.  NBP

## 2023-03-22 NOTE — PROGRESS NOTES
Corazon, confirm I get the op/path reports from Dr. Trinidad' office and update [facility].  NBP

## 2023-03-22 NOTE — PROGRESS NOTES
[place]ia, NP for Dr. Trinidad, was messaged directly by me.  The patient had her surgery that was uncomplicated.  Pathology revealed dilated appendix but no cancer or other abnormality.  She is forwarding the pathology results to me.  CHINO

## 2023-04-27 VITALS — WEIGHT: 136 LBS | BODY MASS INDEX: 22.66 KG/M2 | HEIGHT: 65 IN

## 2023-04-27 DIAGNOSIS — D64.9 ANEMIA, UNSPECIFIED TYPE: Primary | ICD-10-CM

## 2023-04-27 NOTE — PROGRESS NOTES
"[facility]  [address] [place] [address]  Phone: [phone]  Fax: [phone]    History & Physical         Provider: Dr. Lizzeth Strong    Patient Name: Luzmaria HASSAN (age):1955  67 y.o.           Gender: female   Phone: [phone]     Referring Physician: Andrew Meneses     Vital Signs:   Height - 5' 5"  Weight - 136 lb  BMI -  22.63    Plan: EGD    Encounter Diagnosis   Name Primary?    Anemia, unspecified type Yes           History:      Past Medical History:   Diagnosis Date    Anemia, unspecified     At high risk for breast cancer     BMI 22.0-22.9, adult     Diabetes mellitus     History of migraine headaches     History of UTI     Hormone replacement therapy     Hypercholesterolemia     Hypothyroid     Left ovarian cyst     Menopause     Osteopenia     PMB (postmenopausal bleeding)     HANS (stress urinary incontinence, female)     Urinary retention       Past Surgical History:   Procedure Laterality Date    BLEPHAROPLASTY      COLONOSCOPY      COSMETIC SURGERY  9/8/10    Breast reduction    CYST REMOVAL Left     Ring finger    DILATION AND CURETTAGE OF UTERUS      PTERYGIUM Left     REDUCTION OF BOTH BREASTS Bilateral     thumb surgery Left     trigger finger    tonsilectomy      TUBAL LIGATION      tvto sling        Medication List with Changes/Refills   Current Medications    ATORVASTATIN (LIPITOR) 40 MG TABLET        CALCIUM-VITAMIN D 600 MG-10 MCG (400 UNIT) TAB    Take 1 tablet by mouth.    CLOBETASOL (TEMOVATE) 0.05 % CREAM    APPLY TO RASH ON BODY TWICE DAILY FOR 2 WEEKS ON, 2 WEEKS OFF AND REPEAT FOR 2 WEEKS AS NEEDED FOR FLARED. AVOID USE ON FACE UNDERARMS AND GROIN.    DAPAGLIFLOZIN (FARXIGA) 10 MG TABLET        DICYCLOMINE (BENTYL) 20 MG TABLET    Take 1 tablet (20 mg total) by mouth 2 (two) times daily as needed (abdominal cramping or diarrhea).    ESCITALOPRAM " OXALATE (LEXAPRO) 20 MG TABLET    Take 1 tablet (20 mg total) by mouth once daily.    GLIMEPIRIDE (AMARYL) 4 MG TABLET    Take 4 mg by mouth 2 (two) times daily.    LEVOTHYROXINE (SYNTHROID) 75 MCG TABLET    Take 75 mcg by mouth.    PROMETHAZINE (PHENERGAN) 12.5 MG TAB    Take 12.5 mg by mouth.    SITAGLIPTAN-METFORMIN (JANUMET XR) 50-1,000 MG TM24    Take 1 tablet by mouth 2 (two) times daily.    TRUE METRIX GLUCOSE TEST STRIP STRP          Review of patient's allergies indicates:  No Known Allergies   Family History   Problem Relation Age of Onset    Breast cancer Mother     Atrial fibrillation Mother     Atrial fibrillation Father     Hypertension Father     Diabetes Father     Heart disease Father     No Known Problems Sister     No Known Problems Brother     No Known Problems Maternal Grandmother     No Known Problems Maternal Grandfather     No Known Problems Paternal Grandmother     No Known Problems Paternal Grandfather       Social History     Tobacco Use    Smoking status: Former     Packs/day: 0.00     Years: 10.00     Pack years: 0.00     Types: Cigarettes     Start date: 1975     Quit date: 1986     Years since quittin.7    Smokeless tobacco: Never    Tobacco comments:     STOP SMOKING AT AGE 30    Substance Use Topics    Alcohol use: Yes     Alcohol/week: 2.0 standard drinks     Types: 2 Drinks containing 0.5 oz of alcohol per week     Comment: OCASSIONAL     Drug use: Never        Physical Examination:     General Appearance:___________________________  HEENT: _____________________________________  Abdomen:____________________________________  Heart:________________________________________  Lungs:_______________________________________  Extremities:___________________________________  Skin:_________________________________________  Endocrine:____________________________________  Genitourinary:_________________________________  Neurological:__________________________________      Patient  has been evaluated immediately prior to sedation and is medically cleared for endoscopy with IVCS as an ASA class: ______      Physician Signature: _________________________       Date: ________  Time: ________

## 2023-05-03 ENCOUNTER — TELEPHONE (OUTPATIENT)
Dept: OBSTETRICS AND GYNECOLOGY | Facility: CLINIC | Age: 68
End: 2023-05-03
Payer: MEDICARE

## 2023-05-03 NOTE — TELEPHONE ENCOUNTER
Patient states she was in for her visit in September and Dr Lundberg took her off her hormones. She was advised to call if she started with any symptoms and she would be placed on a natural supplement. She states she has started to experience hot flashes and would like more information on the supplement. Advised patient that I will check with Dr Lundberg this afternoon when he returns from surgery and see what he recommends. Patient verbalized understanding.

## 2023-05-03 NOTE — TELEPHONE ENCOUNTER
----- Message from Giuliana Vipin sent at 5/2/2023 10:51 AM CDT -----  Contact: Patient  Patient called to consult with nurse or staff regarding her hormones. She states she was taken off her hormones medication in September and states she could call for a natural hormone replacement. She wanted to speak with office regarding recommendations and would like a call back. She can be reached at [phone]. Thanks/

## 2023-05-04 NOTE — TELEPHONE ENCOUNTER
Spoke to patient, advised that Dr Lundberg recommends trying a product called Relizen. Placed samples up front for her to  and try. Advised to call us in a few weeks if she is still having symptoms. Patient verbalized understanding.

## 2023-08-09 ENCOUNTER — TELEPHONE (OUTPATIENT)
Dept: GASTROENTEROLOGY | Facility: CLINIC | Age: 68
End: 2023-08-09

## 2023-08-09 ENCOUNTER — OFFICE VISIT (OUTPATIENT)
Dept: GASTROENTEROLOGY | Facility: CLINIC | Age: 68
End: 2023-08-09
Payer: MEDICARE

## 2023-08-09 VITALS
OXYGEN SATURATION: 90 % | BODY MASS INDEX: 22.19 KG/M2 | HEART RATE: 71 BPM | WEIGHT: 133.19 LBS | HEIGHT: 65 IN | DIASTOLIC BLOOD PRESSURE: 70 MMHG | SYSTOLIC BLOOD PRESSURE: 119 MMHG

## 2023-08-09 DIAGNOSIS — R74.01 ELEVATED AST (SGOT): ICD-10-CM

## 2023-08-09 DIAGNOSIS — K58.0 IRRITABLE BOWEL SYNDROME WITH DIARRHEA: ICD-10-CM

## 2023-08-09 DIAGNOSIS — D64.9 ANEMIA, UNSPECIFIED TYPE: ICD-10-CM

## 2023-08-09 DIAGNOSIS — D50.9 IRON DEFICIENCY ANEMIA, UNSPECIFIED IRON DEFICIENCY ANEMIA TYPE: Primary | ICD-10-CM

## 2023-08-09 DIAGNOSIS — M62.89 PELVIC FLOOR DYSFUNCTION: ICD-10-CM

## 2023-08-09 DIAGNOSIS — R19.7 DIARRHEA, UNSPECIFIED TYPE: ICD-10-CM

## 2023-08-09 PROCEDURE — 99215 OFFICE O/P EST HI 40 MIN: CPT | Mod: S$GLB,,, | Performed by: INTERNAL MEDICINE

## 2023-08-09 PROCEDURE — 99215 PR OFFICE/OUTPT VISIT, EST, LEVL V, 40-54 MIN: ICD-10-PCS | Mod: S$GLB,,, | Performed by: INTERNAL MEDICINE

## 2023-08-09 RX ORDER — DULAGLUTIDE 0.75 MG/.5ML
INJECTION, SOLUTION SUBCUTANEOUS
COMMUNITY
Start: 2023-07-30 | End: 2023-09-28

## 2023-08-09 RX ORDER — LEVOTHYROXINE SODIUM 25 UG/1
25 TABLET ORAL
COMMUNITY
Start: 2023-08-03

## 2023-08-09 RX ORDER — FERROUS SULFATE 325(65) MG
325 TABLET ORAL DAILY
COMMUNITY

## 2023-08-09 NOTE — LETTER
August 9, 2023        Andrew Meneses MD  [address]  [place] [address]             [facility]  [address] [address]  Phone: [phone]  Fax: [phone]   Patient: Luzmaria Grossman   MR Number: [id number]   YOB: 1955   Date of Visit: 8/9/2023       Dear Dr. Meneses:    Thank you for referring Luzmaria Grossman to me for evaluation. Attached you will find relevant portions of my assessment and plan of care.    If you have questions, please do not hesitate to call me. I look forward to following Luzmaria Grossman along with you.    Sincerely,      Lizzeth Strong MD            CC  No Recipients    Enclosure

## 2023-08-09 NOTE — PROGRESS NOTES
Clinic Note    Reason for visit:  The primary encounter diagnosis was Iron deficiency anemia, unspecified iron deficiency anemia type. Diagnoses of Pelvic floor dysfunction, Diarrhea, unspecified type, Irritable bowel syndrome with diarrhea, Anemia, unspecified type, and Elevated AST (SGOT) were also pertinent to this visit.    PCP: Andrew Meneses.       HPI:  This is a 67 y.o. female who is here for a follow up. Patient with increased IEL on colonoscopy 10/2022. She took Pepto 2 tablets QID for 8 weeks and was still having loose stool with fecal incontinence. She also has ARTURO was told to begin taking iron by mouth BID and to schedule EGD. The EGD was scheduled and canceled by patient twice. She was referred to Dr. Trinidad for further evaluation of incontinence. She had anal manometry and found to have pelvic floor dysfunction and Dr. Trinidad recommended pelvic floor therapy and if that does not help, then may be candidate for sacral nerve stimulation or modulation. 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     Discussed EGD and     3/13/2023 cecectomy path: benign    3 Dr. Trinidad OV notes: 2/10/2023 pelvic floor weakness --> PT, appendiceal ?mucocele --> pending cecectomy.     Labs 12/2022: Calpro 77B, elast/fat nl. Giardia neg. Hb 11.7L, MCV 90.6, CBC onl, ferr 6.16L, Fe 15L, T/LD/B12/fol/Hbelect/Hapto/Trnsfrn nl, 4%L, retic 2.1H, AP 111H, CMP onl. CRP/ESR/tTG/IgA/Endomy nl. TSH 0.13L,     Colonoscopy 10/12/2022: Moderate diverticulosis of the sigmoid colon and whole colon. CBx incr'd IEL.    Review of Systems   Constitutional:  Negative for fatigue, fever and unexpected weight change.   HENT:  Negative for mouth sores, postnasal drip, sore throat and trouble swallowing.    Eyes:  Negative for pain, discharge and eye dryness.   Respiratory:  Negative for apnea, cough, choking, chest tightness, shortness of breath and wheezing.    Cardiovascular:  Negative for chest pain, palpitations and leg swelling.    Gastrointestinal:  Positive for fecal incontinence. Negative for abdominal distention, abdominal pain, anal bleeding, blood in stool, change in bowel habit, constipation, diarrhea, nausea, rectal pain, vomiting, reflux and change in bowel habit.   Genitourinary:  Negative for bladder incontinence, dysuria and hematuria.   Musculoskeletal:  Negative for arthralgias, back pain and joint swelling.   Integumentary:  Negative for color change and rash.   Allergic/Immunologic: Negative for environmental allergies and food allergies.   Neurological:  Negative for seizures and headaches.   Hematological:  Negative for adenopathy. Does not bruise/bleed easily.        Past Medical History:   Diagnosis Date    Anemia, unspecified     At high risk for breast cancer     BMI 22.0-22.9, adult     Diabetes mellitus     History of migraine headaches     History of UTI     Hormone replacement therapy     Hypercholesterolemia     Hypothyroid     Left ovarian cyst     Menopause     Osteopenia     PMB (postmenopausal bleeding)     HANS (stress urinary incontinence, female)     Urinary retention      Past Surgical History:   Procedure Laterality Date    BLEPHAROPLASTY      CECECTOMY  03/2023    COLONOSCOPY      COSMETIC SURGERY  9/8/10    Breast reduction    CYST REMOVAL Left     Ring finger    DILATION AND CURETTAGE OF UTERUS      x2    PTERYGIUM Left     REDUCTION OF BOTH BREASTS Bilateral     thumb surgery Left     trigger finger    tonsilectomy      TUBAL LIGATION      tvto sling       Family History   Problem Relation Age of Onset    Breast cancer Mother     Atrial fibrillation Mother     Atrial fibrillation Father     Hypertension Father     Diabetes Father     Heart disease Father     No Known Problems Sister     No Known Problems Brother     No Known Problems Maternal Grandmother     No Known Problems Maternal Grandfather     No Known Problems Paternal Grandmother     No Known Problems Paternal Grandfather      Social History  "    Tobacco Use    Smoking status: Former     Current packs/day: 0.00     Types: Cigarettes     Start date: 1975     Quit date: 1986     Years since quittin.0    Smokeless tobacco: Never    Tobacco comments:     STOP SMOKING AT AGE 30    Substance Use Topics    Alcohol use: Yes     Alcohol/week: 2.0 standard drinks of alcohol     Types: 2 Drinks containing 0.5 oz of alcohol per week     Comment: OCASSIONAL     Drug use: Never     Review of patient's allergies indicates:  No Known Allergies     Medication List with Changes/Refills   Current Medications    ATORVASTATIN (LIPITOR) 40 MG TABLET        CALCIUM-VITAMIN D 600 MG-10 MCG (400 UNIT) TAB    Take 1 tablet by mouth.    CLOBETASOL (TEMOVATE) 0.05 % CREAM    APPLY TO RASH ON BODY TWICE DAILY FOR 2 WEEKS ON, 2 WEEKS OFF AND REPEAT FOR 2 WEEKS AS NEEDED FOR FLARED. AVOID USE ON FACE UNDERARMS AND GROIN.    DAPAGLIFLOZIN (FARXIGA) 10 MG TABLET        DICYCLOMINE (BENTYL) 20 MG TABLET    Take 1 tablet (20 mg total) by mouth 2 (two) times daily as needed (abdominal cramping or diarrhea).    ESCITALOPRAM OXALATE (LEXAPRO) 20 MG TABLET    Take 1 tablet (20 mg total) by mouth once daily.    FERROUS SULFATE (FEOSOL) 325 MG (65 MG IRON) TAB TABLET    Take 325 mg by mouth once daily.    GLIMEPIRIDE (AMARYL) 4 MG TABLET    Take 4 mg by mouth 2 (two) times daily.    LEVOTHYROXINE (SYNTHROID) 25 MCG TABLET    Take 25 mcg by mouth.    POLLEN EXTRACTS (RELIZEN ORAL)        SITAGLIPTAN-METFORMIN (JANUMET XR) 50-1,000 MG TM24    Take 1 tablet by mouth 2 (two) times daily.    TRUE METRIX GLUCOSE TEST STRIP STRP        TRULICITY 0.75 MG/0.5 ML PEN INJECTOR    INJECT THE CONTENTS OF 1 SYRINGE UNDER THE SKIN ONCE A WEEK   Discontinued Medications    LEVOTHYROXINE (SYNTHROID) 75 MCG TABLET    Take 75 mcg by mouth.    PROMETHAZINE (PHENERGAN) 12.5 MG TAB    Take 12.5 mg by mouth.         Vital Signs:  /70   Pulse 71   Ht 5' 5" (1.651 m)   Wt 60.4 kg (133 lb 3.2 oz)  "  SpO2 (!) 90%   BMI 22.17 kg/m²         Physical Exam  Vitals reviewed.   Constitutional:       General: She is awake. She is not in acute distress.     Appearance: Normal appearance. She is well-developed. She is not ill-appearing, toxic-appearing or diaphoretic.   HENT:      Head: Normocephalic and atraumatic.      Nose: Nose normal.      Mouth/Throat:      Mouth: Mucous membranes are moist.      Pharynx: Oropharynx is clear. No oropharyngeal exudate or posterior oropharyngeal erythema.   Eyes:      General: Lids are normal. Gaze aligned appropriately. No scleral icterus.        Right eye: No discharge.         Left eye: No discharge.      Conjunctiva/sclera: Conjunctivae normal.   Neck:      Trachea: Trachea normal.   Cardiovascular:      Rate and Rhythm: Normal rate and regular rhythm.      Pulses:           Radial pulses are 2+ on the right side and 2+ on the left side.   Pulmonary:      Effort: Pulmonary effort is normal. No respiratory distress.      Breath sounds: No stridor. No wheezing.   Chest:      Chest wall: No tenderness.   Abdominal:      General: Bowel sounds are normal. There is no distension.      Palpations: Abdomen is soft. There is no fluid wave, hepatomegaly or mass.      Tenderness: There is no abdominal tenderness. There is no guarding or rebound.   Musculoskeletal:         General: No tenderness or deformity.      Cervical back: Full passive range of motion without pain and neck supple. No tenderness.      Right lower leg: No edema.      Left lower leg: No edema.   Lymphadenopathy:      Cervical: No cervical adenopathy.   Skin:     General: Skin is warm and dry.      Capillary Refill: Capillary refill takes less than 2 seconds.      Coloration: Skin is not cyanotic, jaundiced or pale.   Neurological:      General: No focal deficit present.      Mental Status: She is alert and oriented to person, place, and time.      Motor: No tremor.   Psychiatric:         Attention and Perception:  Attention normal.         Mood and Affect: Mood and affect normal.         Speech: Speech normal.         Behavior: Behavior normal. Behavior is cooperative.            All of the data above and below has been reviewed by myself and any further interpretations will be reflected in the assessment and plan.   The data includes review of external notes, and independent interpretation of lab results, procedures, x-rays, and imaging reports.      Assessment:  Iron deficiency anemia, unspecified iron deficiency anemia type  -     Ambulatory Referral to External Surgery    Pelvic floor dysfunction    Diarrhea, unspecified type  -     Ambulatory Referral to External Surgery    Irritable bowel syndrome with diarrhea    Anemia, unspecified type  -     Ambulatory Referral to External Surgery    Elevated AST (SGOT)    No overt GI blood loss.  Rec EGD with G/D Bx. If unrevealing then plan for CE. Discussed R/B/A.  Get all lab results from [facility] from 2023. May need iron studies if not already done.  Donated blood 1-2 times in 2023. About twice 2022. She will hold on that for now.     Recommendations:  Schedule upper endoscopy.   Continue physical therapy for your pelvic health.  Continue iron pill once daily.    Risks, benefits, and alternatives of medical management, any associated procedures, and/or treatment discussed with the patient. Patient given opportunity to ask questions and voices understanding. Patient has elected to proceed with the recommended care modalities as discussed.    Instructed patient to notify my office if they have not been contacted within two weeks after any procedures, submitting any samples (biopsies, blood, stool, urine, etc.) or after any imaging (X-ray, CT, MRI, etc.).     Follow up in about 1 year (around 8/9/2024).    Order summary:  Orders Placed This Encounter   Procedures    Ambulatory Referral to External Surgery          This document may have been created using a voice recognition  transcribing system. Incorrect words or phrases may have been missed during proofreading. Please interpret accordingly or contact me for clarification.     Lizzeth Strong MD

## 2023-08-09 NOTE — LETTER
August 9, 2023        Andrew Meneses MD  [facility] [address] [address]             [facility]  [address] [address]  Phone: [phone]  Fax: [phone]   Patient: Luzmaria Grossman   MR Number: [id number]   YOB: 1955   Date of Visit: 8/9/2023       Dear Dr. Meneses:    Thank you for referring Luzmaria Grossman to me for evaluation. Attached you will find relevant portions of my assessment and plan of care.    If you have questions, please do not hesitate to call me. I look forward to following Luzmaria Grossman along with you.    Sincerely,      Lizzeth Strong MD            CC  No Recipients    Enclosure

## 2023-08-31 ENCOUNTER — OUTSIDE PLACE OF SERVICE (OUTPATIENT)
Dept: GASTROENTEROLOGY | Facility: CLINIC | Age: 68
End: 2023-08-31

## 2023-08-31 PROCEDURE — 43239 PR EGD, FLEX, W/BIOPSY, SGL/MULTI: ICD-10-PCS | Mod: ,,, | Performed by: INTERNAL MEDICINE

## 2023-08-31 PROCEDURE — 43239 EGD BIOPSY SINGLE/MULTIPLE: CPT | Mod: ,,, | Performed by: INTERNAL MEDICINE

## 2023-09-01 ENCOUNTER — TELEPHONE (OUTPATIENT)
Dept: OBSTETRICS AND GYNECOLOGY | Facility: CLINIC | Age: 68
End: 2023-09-01
Payer: MEDICARE

## 2023-09-01 DIAGNOSIS — F52.0 DECREASED SEXUAL DESIRE: Primary | ICD-10-CM

## 2023-09-01 RX ORDER — FLIBANSERIN 100 MG/1
100 TABLET, FILM COATED ORAL NIGHTLY
Qty: 30 TABLET | Refills: 12 | Status: SHIPPED | OUTPATIENT
Start: 2023-09-01 | End: 2023-09-28 | Stop reason: SDUPTHER

## 2023-09-01 NOTE — TELEPHONE ENCOUNTER
----- Message from Shade Neal sent at 9/1/2023  9:47 AM CDT -----  Contact: Pt  Pt is calling rg wanting to have something to help her decrease in sex drive and can be reached at [phone]/anjelica/mary beth     [facility] [address] [place] [address] [place] - [address]  [address] [facility] [address]  Phone: [phone] Fax: [phone]

## 2023-09-01 NOTE — TELEPHONE ENCOUNTER
Spoke to patient she c/o decreased sexual desire. Advised I will consult with Dr Lundberg and have something called out for her. Patient verbalized understanding.

## 2023-09-01 NOTE — TELEPHONE ENCOUNTER
Patient c/o low sexual desire. Allergies reviewed. Pharmacy up to date. Medication pending. Please approve.

## 2023-09-05 ENCOUNTER — TELEPHONE (OUTPATIENT)
Dept: OBSTETRICS AND GYNECOLOGY | Facility: CLINIC | Age: 68
End: 2023-09-05
Payer: MEDICARE

## 2023-09-05 NOTE — TELEPHONE ENCOUNTER
Spoke to patient and advised that a prescription was sent to pharmacy. Patient verbalized understanding.

## 2023-09-05 NOTE — TELEPHONE ENCOUNTER
----- Message from Shade Neal sent at 9/5/2023  2:44 PM CDT -----  Contact: Pt  Pt is calling rg speaking with Stephanie and wanting to discuss her medication Laureano and can be reached at [phone]//thanks/dbw

## 2023-09-05 NOTE — TELEPHONE ENCOUNTER
Spoke to patient, she states she did some research that showed Addyi was not for post menopausal women. Advised I check with Dr Lundberg and he said it was ok for her to take and to let us know if she has any problems once she starts it. Patient verbalized understanding.

## 2023-09-05 NOTE — TELEPHONE ENCOUNTER
----- Message from Giuliana Lew sent at 9/5/2023 11:43 AM CDT -----  Contact: Patient  Patient called to consult with nurse or staff regarding medication. She states she spoke with clinic about having a prescription called out and patient wanted to check on the status of this. She would like a call back and can be reached at [phone]. Thanks/Mr

## 2023-09-07 ENCOUNTER — TELEPHONE (OUTPATIENT)
Dept: GASTROENTEROLOGY | Facility: CLINIC | Age: 68
End: 2023-09-07

## 2023-09-07 ENCOUNTER — TELEPHONE (OUTPATIENT)
Dept: GASTROENTEROLOGY | Facility: CLINIC | Age: 68
End: 2023-09-07
Payer: MEDICARE

## 2023-09-07 DIAGNOSIS — D50.9 IRON DEFICIENCY ANEMIA, UNSPECIFIED IRON DEFICIENCY ANEMIA TYPE: Primary | ICD-10-CM

## 2023-09-07 NOTE — TELEPHONE ENCOUNTER
DBx nl, Gbx react w/mild chr gitis w/o Hp.    Notify patient. No signs of infection, precancerous cells or Celiac disease on the upper endoscopy biopsies.  Rec CE given her ARTURO.  NBP

## 2023-09-26 ENCOUNTER — OUTSIDE PLACE OF SERVICE (OUTPATIENT)
Dept: GASTROENTEROLOGY | Facility: CLINIC | Age: 68
End: 2023-09-26
Payer: MEDICARE

## 2023-09-26 ENCOUNTER — TELEPHONE (OUTPATIENT)
Dept: GASTROENTEROLOGY | Facility: CLINIC | Age: 68
End: 2023-09-26

## 2023-09-26 PROCEDURE — 91110 GI TRC IMG INTRAL ESOPH-ILE: CPT | Mod: 26,,, | Performed by: INTERNAL MEDICINE

## 2023-09-26 PROCEDURE — 91110 PR GI TRACT CAPSULE ENDOSCOPY: ICD-10-PCS | Mod: 26,,, | Performed by: INTERNAL MEDICINE

## 2023-09-26 NOTE — TELEPHONE ENCOUNTER
Notify patient that her capsule endoscopy results were normal.  We were supposed to get her last set of blood results from [place].  Please get from her primary healthcare provider.  Also send copy of capsule endoscopy report to primary healthcare provider.  CHINO

## 2023-09-27 NOTE — TELEPHONE ENCOUNTER
I spoke to patient and gave results of CE.  She states she saw Dr Sam Meneses after labs were done at [facility] and he said everything was ok except A1C.  The labs were scanned in her chart.  She also is asking if she can donate blood?

## 2023-09-27 NOTE — TELEPHONE ENCOUNTER
LVM asking pt to call our office. Most recent lab results (Aug/2023) pulled from [facility] and scanned into chart.

## 2023-09-28 ENCOUNTER — OFFICE VISIT (OUTPATIENT)
Dept: OBSTETRICS AND GYNECOLOGY | Facility: CLINIC | Age: 68
End: 2023-09-28
Payer: MEDICARE

## 2023-09-28 VITALS
SYSTOLIC BLOOD PRESSURE: 127 MMHG | HEART RATE: 76 BPM | BODY MASS INDEX: 22.63 KG/M2 | DIASTOLIC BLOOD PRESSURE: 77 MMHG | WEIGHT: 136 LBS

## 2023-09-28 DIAGNOSIS — Z12.31 BREAST CANCER SCREENING BY MAMMOGRAM: ICD-10-CM

## 2023-09-28 DIAGNOSIS — Z01.419 WELL WOMAN EXAM WITH ROUTINE GYNECOLOGICAL EXAM: Primary | ICD-10-CM

## 2023-09-28 DIAGNOSIS — F52.0 DECREASED SEXUAL DESIRE: ICD-10-CM

## 2023-09-28 PROCEDURE — G0101 CA SCREEN;PELVIC/BREAST EXAM: HCPCS | Mod: S$GLB,,, | Performed by: OBSTETRICS & GYNECOLOGY

## 2023-09-28 PROCEDURE — G0101 PR CA SCREEN;PELVIC/BREAST EXAM: ICD-10-PCS | Mod: S$GLB,,, | Performed by: OBSTETRICS & GYNECOLOGY

## 2023-09-28 RX ORDER — FLIBANSERIN 100 MG/1
100 TABLET, FILM COATED ORAL NIGHTLY
Qty: 30 TABLET | Refills: 12 | Status: SHIPPED | OUTPATIENT
Start: 2023-09-28

## 2023-09-28 RX ORDER — DULAGLUTIDE 1.5 MG/.5ML
INJECTION, SOLUTION SUBCUTANEOUS
COMMUNITY
Start: 2023-09-05

## 2023-09-28 NOTE — PROGRESS NOTES
Subjective:       Patient ID: Luzmaria Grossman is a 68 y.o. female.    Chief Complaint:  Well Woman      History of Present Illness  She is doing well.  No new health issues,  No abnormal bleeding, No GI or Gu concerns,  No dyspareunia.   She is doing well   her  had an MI last year but he is good.     She is on the lexapro  no probs she desires to stay on it     she is on Addyi but has not noted a benefit yet    Her colonoscopy was done within the year   HPI      GYN & OB History  No LMP recorded. Patient is postmenopausal.     Date of Last Pap: No result found    OB History    Para Term  AB Living   4 2           SAB IAB Ectopic Multiple Live Births                  # Outcome Date GA Lbr Eric/2nd Weight Sex Delivery Anes PTL Lv   4             3             2 Para            1 Para                Review of Systems  Review of Systems   Constitutional:  Negative for activity change.   Eyes:  Negative for visual disturbance.   Respiratory:  Negative for shortness of breath.    Cardiovascular:  Negative for chest pain.   Gastrointestinal:  Negative for abdominal pain.   Genitourinary:  Negative for vaginal bleeding.        No abnormal vaginal bleeding   Musculoskeletal:  Negative for back pain.   Integumentary:  Negative for rash and breast mass.   Neurological:  Negative for numbness.   Psychiatric/Behavioral:          No mood disturbance or changes    Breast: Negative for mass            Objective:    Physical Exam:   Constitutional: She is oriented to person, place, and time. She appears well-developed. She is cooperative.    HENT:   Head: Normocephalic.     Neck: Trachea normal. No thyromegaly present.    Cardiovascular:  Normal rate, regular rhythm and normal heart sounds.             Pulmonary/Chest: Effort normal and breath sounds normal. Right breast exhibits no mass, no nipple discharge and no skin change. Left breast exhibits no mass, no nipple discharge and no skin change.         Abdominal: Soft. There is no abdominal tenderness. There is no rebound and no guarding.     Genitourinary:    Vagina and uterus normal.      Pelvic exam was performed with patient supine.   Labial bartholins normal.There is no lesion on the right labia. There is no lesion on the left labia. Cervix is normal. Right adnexum displays no mass and no tenderness. Left adnexum displays no mass and no tenderness. Cervix exhibits no discharge. Uterus is not enlarged and not tender.              Lymphadenopathy:        Head (right side): No submental and no submandibular adenopathy present.        Head (left side): No submental and no submandibular adenopathy present.     She has no cervical adenopathy.    Neurological: She is alert and oriented to person, place, and time.    Skin: Skin is warm.    Psychiatric: She has a normal mood and affect. Her speech is normal and behavior is normal. Thought content normal.          Assessment:        1. Well woman exam with routine gynecological exam    2. Breast cancer screening by mammogram               Plan:           Discussed Addyi  Pap  not done    Mammogram  Follow up one year or sooner if needed  Self breast exams  Consider health profile if labs not done with PCP  Recommend colonoscopy as indicated  Bone density discussed   Pt is aware we call all results. If she does not hear from our office regarding her result within a week of having a study or procedure performed she is to call the office so that we can research the result for her as I do not know when she is scheduling her procedures.   Chaperone was present

## 2023-10-01 ENCOUNTER — TELEPHONE (OUTPATIENT)
Dept: GASTROENTEROLOGY | Facility: CLINIC | Age: 68
End: 2023-10-01
Payer: MEDICARE

## 2023-10-02 NOTE — TELEPHONE ENCOUNTER
8/29/2023 A1c 8.6H, .4H, CBC onl.    The patient asked about if she may resume blood donations. I would say yes but less often. Begin with every 4 months. Notify me if any issues before her OV with me.  NBP

## 2023-10-04 ENCOUNTER — PATIENT MESSAGE (OUTPATIENT)
Dept: OBSTETRICS AND GYNECOLOGY | Facility: CLINIC | Age: 68
End: 2023-10-04
Payer: MEDICARE

## 2024-01-29 RX ORDER — DICYCLOMINE HYDROCHLORIDE 20 MG/1
TABLET ORAL
Qty: 180 TABLET | Refills: 0 | Status: SHIPPED | OUTPATIENT
Start: 2024-01-29 | End: 2024-04-30

## 2024-04-30 RX ORDER — DICYCLOMINE HYDROCHLORIDE 20 MG/1
TABLET ORAL
Qty: 180 TABLET | Refills: 0 | Status: SHIPPED | OUTPATIENT
Start: 2024-04-30

## 2024-05-13 ENCOUNTER — TELEPHONE (OUTPATIENT)
Dept: GASTROENTEROLOGY | Facility: CLINIC | Age: 69
End: 2024-05-13

## 2024-05-13 DIAGNOSIS — R74.8 ELEVATED LIVER ENZYMES: Primary | ICD-10-CM

## 2024-05-13 NOTE — TELEPHONE ENCOUNTER
Patient called. Patient did labs for her pcp. Patient stated her pcp told patient she had elevated liver enzymes and patient pcp ordered her to complete a us of her liver. Patients pcp stated she had echo texture on her liver. Patients pcp was suppose to send over labs. I do not see as the labs uploaded in . I asked patient to please go get labs and bring them by the office. 5/13/24 LRA     ----- Message from Giuliana Lew sent at 5/13/2024  2:59 PM CDT -----  Contact: Patient  Patient called to consult with nurse or staff regarding her recent visit to her PCP. She states Dr. Meneses was to send her recent lab results showing elevated liver enzymes. She wanted to verify if the office received this information and would like a call back. She can be reached at [phone]. Thanks/MR

## 2024-05-17 NOTE — TELEPHONE ENCOUNTER
5/2/2024 RUQ ABD US- heterogenous liver, ruben, normal bile duct, patent PV..    US reviewed in [facility]. Please request labs from PCP  Kn

## 2024-05-17 NOTE — TELEPHONE ENCOUNTER
Patient came by office and drop us report off and also her labs. Scanning them in  under [facility] dr. Meneses records. 5/17/24 LRA

## 2024-05-20 ENCOUNTER — TELEPHONE (OUTPATIENT)
Dept: GASTROENTEROLOGY | Facility: CLINIC | Age: 69
End: 2024-05-20
Payer: MEDICARE

## 2024-05-20 NOTE — TELEPHONE ENCOUNTER
Called patient and let her know. Patient is aware to keep apt in August. Patient will complete labs at the path lab before her apt. Reminder set for labs.  5/20/24

## 2024-05-20 NOTE — TELEPHONE ENCOUNTER
Called patient and gave her results to her. Patient understood. Patient stated she has to go complete labs for her endroclognist. Patient will get her labs and bring a couple by our office once they are complete as well. Patient is xavier for 8/13/24. Please advise if patient needs to be xavier or if in Aug is ok. 5/20/24 LRA

## 2024-05-20 NOTE — TELEPHONE ENCOUNTER
4/30/2024 AST 53H, ALT 115H, ALP 139H- lfts-nl  5/7/2024 AST46, ALT 75H,  lfts-nl    Labs reviewed. Her labwork and US is consistent with fatty liver disease. Her repeat bloodwork in May looks like here liver enzymes improved but were still mildly elevated. We recommend low fat low, carbohydrate diet. She may have a Follow up in 1-2 months with me. Would recommend repeat liver test prior to her appointment.   LFTs ordered now but she will complete blood test week before her appt.  KN

## 2024-05-20 NOTE — TELEPHONE ENCOUNTER
See other encounter for 5/20/24. LRA     ----- Message from Giuliana Lew sent at 5/20/2024  2:33 PM CDT -----  Contact: Patient  Patient called to consult with nurse or staff regarding records she dropped off at the office sometime last week. She states she was calling to verify if Dr. Strong had looked over the records and would like to see if she has to come in for an appointment. She would like a call back and can be reached at [phone]. Thanks/MR

## 2024-05-28 RX ORDER — INSULIN GLARGINE 300 U/ML
INJECTION, SOLUTION SUBCUTANEOUS
COMMUNITY
Start: 2024-02-07

## 2024-05-28 RX ORDER — TIRZEPATIDE 5 MG/.5ML
INJECTION, SOLUTION SUBCUTANEOUS
COMMUNITY
Start: 2024-02-19

## 2024-05-28 RX ORDER — CHOLESTYRAMINE 4 G/9G
4 POWDER, FOR SUSPENSION ORAL DAILY
Qty: 90 PACKET | Refills: 1 | Status: SHIPPED | OUTPATIENT
Start: 2024-05-28

## 2024-05-28 RX ORDER — CHOLESTYRAMINE 4 G/9G
4 POWDER, FOR SUSPENSION ORAL
Qty: 270 PACKET | Refills: 3 | Status: CANCELLED | OUTPATIENT
Start: 2024-05-28 | End: 2025-05-28

## 2024-05-28 NOTE — TELEPHONE ENCOUNTER
----- Message from Sahra Layne sent at 5/28/2024  9:21 AM CDT -----  Patient is calling in regards to would like medication Questran called in pharmacy also if its going to effect diabetic medication please call her back at [phone] (home)

## 2024-05-28 NOTE — TELEPHONE ENCOUNTER
Pt reports trying questran a few years ago for diarrhea and would like to know if she could try taking it again? Also will it interfere with any of her current diabetes meds? Updated med list. Added Mounjaro and Toujeo and stopped Trulicity and Janumet. Still taking glimepiride and farxiga. -KG

## 2024-05-28 NOTE — TELEPHONE ENCOUNTER
Questran is safe to take. It should be taken 2 hour before or 4 hours after any other medication. It may interfere with the absorption of the medication if taken at same time. It is safe to take with Mounjaro.  I sent prescription to pharmacy.  SKIP

## 2024-07-08 ENCOUNTER — TELEPHONE (OUTPATIENT)
Dept: GASTROENTEROLOGY | Facility: CLINIC | Age: 69
End: 2024-07-08
Payer: MEDICARE

## 2024-07-08 RX ORDER — CHOLESTYRAMINE 4 G/9G
4 POWDER, FOR SUSPENSION ORAL 2 TIMES DAILY
Qty: 180 PACKET | Refills: 3 | Status: SHIPPED | OUTPATIENT
Start: 2024-07-08 | End: 2025-07-08

## 2024-07-08 NOTE — TELEPHONE ENCOUNTER
----- Message from Skye [place] sent at 7/8/2024  9:43 AM CDT -----  Contact: pt  Pt calling about script for cholestyramine (QUESTRAN) 4 gram stating she has been taking 2@day and would like a new script.  Pt can be reached at [phone]       [address] - [address] [address]  Phone: [phone] Fax: [phone]      Thanks,

## 2024-07-08 NOTE — TELEPHONE ENCOUNTER
Cholestyramine 4 gram packet.    Pharmacy told patient she isn't due for a refill until August. Patient said she's been taking 2 a day instead of 1 and it really helps. She said this is the first thing that has helped her in 10 years. Patient requested to send out new script written as 2 packets daily rather than 1 packet once daily. DMP

## 2024-07-28 DIAGNOSIS — R74.8 ELEVATED LIVER ENZYMES: Primary | ICD-10-CM

## 2024-07-28 DIAGNOSIS — K76.9 LIVER DISEASE, UNSPECIFIED: ICD-10-CM

## 2024-07-28 DIAGNOSIS — R19.7 DIARRHEA, UNSPECIFIED TYPE: ICD-10-CM

## 2024-07-29 RX ORDER — DICYCLOMINE HYDROCHLORIDE 20 MG/1
TABLET ORAL
Qty: 180 TABLET | Refills: 0 | Status: SHIPPED | OUTPATIENT
Start: 2024-07-29

## 2024-07-29 NOTE — TELEPHONE ENCOUNTER
Refilled.    7/29/2024 AST 34H, ALT 64H, ALP 131H, Tbili 0.7  Call with results. Her labs are still showing elevated liver enzymes. It does show mild improvement but they are consistently elevated. Since they continue to be elevated, would recommend further blood testing to r/o any autoimmune/viral/metabolic causes of elevated liver enzymes other than fatty liver. She should keep her OV as scheduled with NBP 8/13/2024.  Order signed.  SKIP

## 2024-08-02 LAB
%TRANSFERRIN SATURATION: 26.4 % (ref 20–50)
ANA SER-ACNC: NEGATIVE
CYTOPLASMIC PATTERN: NORMAL
FERRITIN: 124 NG/ML (ref 20–288)
GGTP: 29 U/L (ref 5–36)
HBV SURFACE AB SER-ACNC: NEGATIVE M[IU]/ML
HBV SURFACE AG SERPL QL IA: NONREACTIVE
HCV IGG SERPL QL IA: NONREACTIVE
HEPATITIS A IGM: NONREACTIVE
HIV 1+2 AB+HIV1 P24 AG SERPL QL IA: NONREACTIVE
INR PPP: 0.9 (ref 0.88–1.12)
IRON BINDING CAPACITY: 337 UG/DL (ref 262–472)
IRON SERPL-MCNC: 89 UG/DL (ref 37–145)
PROTIME: 9.4 SECONDS (ref 9.3–11.5)
T4, FREE: 1.08 NG/DL (ref 0.93–1.7)
TSH W/REFLEX TO FT4: 5.89 UIU/ML (ref 0.27–4.2)
UIBC SERPL-MCNC: 248 UG/DL (ref 112–306)

## 2024-08-13 ENCOUNTER — OFFICE VISIT (OUTPATIENT)
Dept: GASTROENTEROLOGY | Facility: CLINIC | Age: 69
End: 2024-08-13
Payer: MEDICARE

## 2024-08-13 VITALS
SYSTOLIC BLOOD PRESSURE: 112 MMHG | HEART RATE: 63 BPM | HEIGHT: 65 IN | WEIGHT: 133 LBS | DIASTOLIC BLOOD PRESSURE: 72 MMHG | BODY MASS INDEX: 22.16 KG/M2 | OXYGEN SATURATION: 98 %

## 2024-08-13 DIAGNOSIS — R19.7 DIARRHEA, UNSPECIFIED TYPE: ICD-10-CM

## 2024-08-13 DIAGNOSIS — R74.8 ELEVATED LIVER ENZYMES: Primary | ICD-10-CM

## 2024-08-13 DIAGNOSIS — D50.9 IRON DEFICIENCY ANEMIA, UNSPECIFIED IRON DEFICIENCY ANEMIA TYPE: ICD-10-CM

## 2024-08-13 DIAGNOSIS — K75.81 NASH (NONALCOHOLIC STEATOHEPATITIS): ICD-10-CM

## 2024-08-13 DIAGNOSIS — M62.89 PELVIC FLOOR DYSFUNCTION: ICD-10-CM

## 2024-08-13 PROCEDURE — 99214 OFFICE O/P EST MOD 30 MIN: CPT | Mod: S$GLB,,, | Performed by: INTERNAL MEDICINE

## 2024-08-13 RX ORDER — DICYCLOMINE HYDROCHLORIDE 20 MG/1
20 TABLET ORAL 2 TIMES DAILY
Qty: 180 TABLET | Refills: 4 | Status: SHIPPED | OUTPATIENT
Start: 2024-08-13

## 2024-08-13 NOTE — PROGRESS NOTES
Clinic Note    Reason for visit:  The primary encounter diagnosis was Elevated liver enzymes. Diagnoses of Iron deficiency anemia, unspecified iron deficiency anemia type, CARDONA (nonalcoholic steatohepatitis), Pelvic floor dysfunction, and Diarrhea, unspecified type were also pertinent to this visit.    PCP: Andrew Meneses.       HPI:  This is a 68 y.o. female who is here for a follow up. Patient with elevated LFTs. Liver work up consistent with CARDONA. She was taken off Lipitor 4/25/2024 for about 2-3 weeks to see if liver enzymes improved. They did go down and she was put back on Lipitor. She denies alcohol use. She takes cholestyramine BID and dicyclomine 20 mg BID. Since starting cholestyramine, she has only had one incontinence episode. Her stools are more solid. May go 5-6 times per day. No blood in stool. On iron by mouth once daily.  She did pelvic floor therapy.     RUQ US 5/2/2024: No GB. PV patent. Heterogeneous hepatic echo-texture as can be see with underlying hepatocellular disease.     8/2/2024: TSH 5.89H, Ferritin/iron panel/INR/GGT/T4/A1AT/ceruloplasmin-nl  SKYLAR/HepAigm/HepBsAB/sAG/core/HCVAB/HIV/LKM-1AB/SMAB/AMA-neg. Pending, ANCA, ALKPHOS-ISO   Fibrosis score 0.29. [id number]- severe CARDONA  7/29/2024 AST 34H, ALT 64H, ALP 131H, Tbili 0.7   5/21/2024: 44/63H, CMP onl, TSH/FT4 wnl  5/7/2024: 46/75H/104/0.20, HFP onl (off lipitor)  4/30/2024: 53H/115H/139H, HFP onl  4/25/2024: AST 120H, ALT 233H (on lipitor but held after this)  8/29/2023 A1c 8.6H, .4H, CBC onl.   Labs 12/2022: Calpro 77B, elast/fat nl. Giardia neg. Hb 11.7L, MCV 90.6, CBC onl, ferr 6.16L, Fe 15L, T/LD/B12/fol/Hbelect/Hapto/Trnsfrn nl, 4%L, retic 2.1H, AP 111H, CMP onl. CRP/ESR/tTG/IgA/Endomy nl. TSH 0.13L     3/13/2023 cecectomy path: benign     3 Dr. Trinidad OV notes: 2/10/2023 pelvic floor weakness --> PT, appendiceal ?mucocele --> pending cecectomy.    Capsule Endoscopy 9/26/2023: Normal  EGD 8/31/2023: Sliding small HH. DBx  nl, Gbx react w/mild chr gitis w/o Hp  Colonoscopy 10/12/2022: Moderate diverticulosis of the sigmoid colon and whole colon. CBx incr'd IEL.     Review of Systems   Constitutional:  Negative for fatigue, fever and unexpected weight change.   HENT:  Negative for mouth sores, postnasal drip, sore throat and trouble swallowing.    Eyes:  Negative for pain, discharge and eye dryness.   Respiratory:  Negative for apnea, cough, choking, chest tightness, shortness of breath and wheezing.    Cardiovascular:  Negative for chest pain, palpitations and leg swelling.   Gastrointestinal:  Positive for fecal incontinence. Negative for abdominal distention, abdominal pain, anal bleeding, blood in stool, change in bowel habit, constipation, diarrhea, nausea, rectal pain, vomiting and reflux.   Genitourinary:  Negative for bladder incontinence, dysuria and hematuria.   Musculoskeletal:  Negative for arthralgias, back pain and joint swelling.   Integumentary:  Negative for color change and rash.   Allergic/Immunologic: Negative for environmental allergies and food allergies.   Neurological:  Negative for seizures and headaches.   Hematological:  Negative for adenopathy. Does not bruise/bleed easily.        Past Medical History:   Diagnosis Date    Anemia, unspecified     At high risk for breast cancer     BMI 22.0-22.9, adult     Diabetes mellitus     History of migraine headaches     History of UTI     Hormone replacement therapy     Hypercholesterolemia     Hypothyroid     Left ovarian cyst     Menopause     Osteopenia     PMB (postmenopausal bleeding)     HANS (stress urinary incontinence, female)      Past Surgical History:   Procedure Laterality Date    APPENDECTOMY  [id number]    BLEPHAROPLASTY      CECECTOMY  03/2023    COLONOSCOPY      COSMETIC SURGERY  9/8/10    Breast reduction    CYST REMOVAL Left     Ring finger    DILATION AND CURETTAGE OF UTERUS      x2    PTERYGIUM Left     REDUCTION OF BOTH BREASTS Bilateral     thumb  surgery Left     trigger finger    tonsilectomy      TUBAL LIGATION      tvto sling       Family History   Problem Relation Name Age of Onset    Breast cancer Mother Mother     Atrial fibrillation Mother Mother     Migraines Mother Mother     Atrial fibrillation Father Iain Duncan     Hypertension Father Iain Duncan     Diabetes Father Iain Duncan     Heart disease Father Iain Duncan     No Known Problems Sister      No Known Problems Brother      No Known Problems Maternal Grandmother      No Known Problems Maternal Grandfather      No Known Problems Paternal Grandmother      No Known Problems Paternal Grandfather      Colon cancer Neg Hx      Crohn's disease Neg Hx      Esophageal cancer Neg Hx      Liver cancer Neg Hx      Liver disease Neg Hx      Rectal cancer Neg Hx      Stomach cancer Neg Hx      Ulcerative colitis Neg Hx       Social History     Tobacco Use    Smoking status: Former     Current packs/day: 0.00     Types: Cigarettes     Start date: 1975     Quit date: 1986     Years since quittin.0    Smokeless tobacco: Never    Tobacco comments:     STOP SMOKING AT AGE 30    Substance Use Topics    Alcohol use: Yes     Alcohol/week: 2.0 standard drinks of alcohol     Types: 2 Drinks containing 0.5 oz of alcohol per week     Comment: OCASSIONAL     Drug use: Never     Review of patient's allergies indicates:  No Known Allergies     Medication List with Changes/Refills   Current Medications    ATORVASTATIN (LIPITOR) 40 MG TABLET        BLOOD-GLUCOSE METER,CONTINUOUS (DEXCOM G7  MISC)    by Misc.(Non-Drug; Combo Route) route.    CALCIUM-VITAMIN D 600 MG-10 MCG (400 UNIT) TAB    Take 1 tablet by mouth.    CHOLESTYRAMINE (QUESTRAN) 4 GRAM PACKET    Take 1 packet (4 g total) by mouth 2 (two) times daily. Avoid taking other medication 2 hours before and 4 hours after taking this medicine.    CLOBETASOL (TEMOVATE) 0.05 % CREAM    APPLY TO RASH ON BODY TWICE DAILY FOR 2 WEEKS ON, 2 WEEKS OFF AND REPEAT FOR 2  "WEEKS AS NEEDED FOR FLARED. AVOID USE ON FACE UNDERARMS AND GROIN.    DAPAGLIFLOZIN (FARXIGA) 10 MG TABLET        ESCITALOPRAM OXALATE (LEXAPRO) 20 MG TABLET    Take 1 tablet (20 mg total) by mouth once daily.    FERROUS SULFATE (FEOSOL) 325 MG (65 MG IRON) TAB TABLET    Take 325 mg by mouth once daily.    GLIMEPIRIDE (AMARYL) 4 MG TABLET    Take 4 mg by mouth 2 (two) times daily.    LEVOTHYROXINE (SYNTHROID) 25 MCG TABLET    Take 25 mcg by mouth.    MOUNJARO 5 MG/0.5 ML PNIJ    Inject into the skin.    TOUJEO SOLOSTAR U-300 INSULIN 300 UNIT/ML (1.5 ML) INPN PEN    INJECT 10 UNITS SUBCUTANEOUSLY ONCE DAILY   Changed and/or Refilled Medications    Modified Medication Previous Medication    DICYCLOMINE (BENTYL) 20 MG TABLET dicyclomine (BENTYL) 20 mg tablet       Take 1 tablet (20 mg total) by mouth 2 (two) times daily.    TAKE 1 TABLET BY MOUTH TWICE DAILY AS NEEDED FOR ABDOMINAL PAIN, CRAMPING OR DIARRHEA   Discontinued Medications    FLIBANSERIN (ADDYI) 100 MG TAB    Take 100 mg by mouth every evening.    POLLEN EXTRACTS (RELIZEN ORAL)        TRUE METRIX GLUCOSE TEST STRIP STRP             Vital Signs:  /72 (BP Location: Left arm, Patient Position: Sitting)   Pulse 63   Ht 5' 5" (1.651 m)   Wt 60.3 kg (133 lb)   SpO2 98%   BMI 22.13 kg/m²         Physical Exam  Vitals reviewed.   Constitutional:       General: She is awake. She is not in acute distress.     Appearance: Normal appearance. She is well-developed. She is not ill-appearing, toxic-appearing or diaphoretic.   HENT:      Head: Normocephalic and atraumatic.      Nose: Nose normal.      Mouth/Throat:      Mouth: Mucous membranes are moist.      Pharynx: Oropharynx is clear. No oropharyngeal exudate or posterior oropharyngeal erythema.   Eyes:      General: Lids are normal. Gaze aligned appropriately. No scleral icterus.        Right eye: No discharge.         Left eye: No discharge.      Conjunctiva/sclera: Conjunctivae normal.   Neck:      " Trachea: Trachea normal.   Cardiovascular:      Rate and Rhythm: Normal rate and regular rhythm.      Pulses:           Radial pulses are 2+ on the right side and 2+ on the left side.   Pulmonary:      Effort: Pulmonary effort is normal. No respiratory distress.      Breath sounds: No stridor. No wheezing.   Chest:      Chest wall: No tenderness.   Abdominal:      General: Bowel sounds are normal. There is no distension.      Palpations: Abdomen is soft. There is no fluid wave, hepatomegaly or mass.      Tenderness: There is no abdominal tenderness. There is no guarding or rebound.   Musculoskeletal:         General: No tenderness or deformity.      Cervical back: Full passive range of motion without pain and neck supple. No tenderness.      Right lower leg: No edema.      Left lower leg: No edema.   Lymphadenopathy:      Cervical: No cervical adenopathy.   Skin:     General: Skin is warm and dry.      Capillary Refill: Capillary refill takes less than 2 seconds.      Coloration: Skin is not cyanotic, jaundiced or pale.   Neurological:      General: No focal deficit present.      Mental Status: She is alert and oriented to person, place, and time.      Motor: No tremor.   Psychiatric:         Attention and Perception: Attention normal.         Mood and Affect: Mood and affect normal.         Speech: Speech normal.         Behavior: Behavior normal. Behavior is cooperative.            All of the data above and below has been reviewed by myself and any further interpretations will be reflected in the assessment and plan.   The data includes review of external notes, and independent interpretation of lab results, procedures, x-rays, and imaging reports.      Assessment:  Elevated liver enzymes    Iron deficiency anemia, unspecified iron deficiency anemia type    CARDONA (nonalcoholic steatohepatitis)    Pelvic floor dysfunction    Diarrhea, unspecified type  -     dicyclomine (BENTYL) 20 mg tablet; Take 1 tablet (20 mg  total) by mouth 2 (two) times daily.  Dispense: 180 tablet; Refill: 4    Doing well with cholestyramine BID and dicyclomine BID.  Elevated LFTs consistent with CARDONA. Discussed diet/exercise. May also be related to medication side effect (Lipitor).  Next colonoscopy due 2032.     Recommendations:  Continue cholestyramine and dicyclomine as is.   Continue with healthy diet and exercise.     Risks, benefits, and alternatives of medical management, any associated procedures, and/or treatment discussed with the patient. Patient given opportunity to ask questions and voices understanding. Patient has elected to proceed with the recommended care modalities as discussed.    Instructed patient to notify my office if they have not been contacted within two weeks after any procedures, submitting any samples (biopsies, blood, stool, urine, etc.) or after any imaging (X-ray, CT, MRI, etc.).     Follow up in about 1 year (around 8/13/2025). With NP    Order summary:  No orders of the defined types were placed in this encounter.     This assessment, plan, and documentation was performed in collaboration with America Velazquez NP.     This document may have been created using a voice recognition transcribing system. Incorrect words or phrases may have been missed during proofreading. Please interpret accordingly or contact me for clarification.     Lizzeth Strong MD

## 2024-08-13 NOTE — LETTER
August 13, 2024        Andrew Meneses MD  [address]  [place] [address]             [facility]  [address] [address]  Phone: [phone]  Fax: [phone]   Patient: Luzmaria Grossman   MR Number: [id number]   YOB: 1955   Date of Visit: 8/13/2024       Dear Dr. Meneses:    Thank you for referring Luzmaria Grossman to me for evaluation. Attached you will find relevant portions of my assessment and plan of care.    If you have questions, please do not hesitate to call me. I look forward to following Luzmaria Grossman along with you.    Sincerely,      Lizzeth Strong MD            CC  No Recipients    Enclosure

## 2024-10-07 ENCOUNTER — TELEPHONE (OUTPATIENT)
Dept: GASTROENTEROLOGY | Facility: CLINIC | Age: 69
End: 2024-10-07
Payer: MEDICARE

## 2024-10-07 NOTE — TELEPHONE ENCOUNTER
Patient presented to the window with [facility] bill. Needs further codes for labwork. Called [facility] and confirmed. They will be faxing over paper to add codes. Please let me know when we receive.  SKIP

## 2024-10-21 ENCOUNTER — OFFICE VISIT (OUTPATIENT)
Dept: OBSTETRICS AND GYNECOLOGY | Facility: CLINIC | Age: 69
End: 2024-10-21
Payer: MEDICARE

## 2024-10-21 VITALS
HEART RATE: 79 BPM | WEIGHT: 132 LBS | HEIGHT: 65 IN | DIASTOLIC BLOOD PRESSURE: 79 MMHG | BODY MASS INDEX: 21.99 KG/M2 | SYSTOLIC BLOOD PRESSURE: 121 MMHG

## 2024-10-21 DIAGNOSIS — Z01.419 WELL WOMAN EXAM WITH ROUTINE GYNECOLOGICAL EXAM: Primary | ICD-10-CM

## 2024-10-21 PROCEDURE — G0101 CA SCREEN;PELVIC/BREAST EXAM: HCPCS | Mod: S$PBB,GZ,,

## 2024-10-21 RX ORDER — GLIMEPIRIDE 2 MG/1
2 TABLET ORAL 2 TIMES DAILY
COMMUNITY
Start: 2024-07-30

## 2024-10-21 RX ORDER — TIRZEPATIDE 2.5 MG/.5ML
INJECTION, SOLUTION SUBCUTANEOUS
COMMUNITY
Start: 2024-05-06

## 2024-10-21 RX ORDER — METHOCARBAMOL 500 MG/1
TABLET, FILM COATED ORAL
COMMUNITY
Start: 2024-10-03

## 2024-10-21 RX ORDER — INSULIN ASPART 100 [IU]/ML
10 INJECTION, SOLUTION INTRAVENOUS; SUBCUTANEOUS 3 TIMES DAILY
COMMUNITY
Start: 2024-10-05

## 2024-10-21 NOTE — PROGRESS NOTES
"  Subjective:      Patient ID: Luzmaria Grossman is a 69 y.o. female who presents for evaluation today.    Chief Complaint:    Well Woman      History of Present Illness  HPI  Annual Exam (Postmenopausal) - Patient presents for annual exam. The patient has has no complaints today. The patient is sexually active. GYN screening history: last pap: was normal and last mammogram: was normal. The patient is not taking hormone replacement therapy. Patient denies post-menopausal vaginal bleeding. The patient wears seatbelts: yes. The patient participates in regular exercise: yes. Has the patient ever been transfused or tattooed?: not asked. The patient reports that there is not domestic violence in her life. She uses bonafide supplement Rilizen for hot flashes which works for her. She denies GI or  problems. She does her mammograms at [facility]. She recently started treatment for Bell's Palsy with steroids & valtrex at the ER.     GYN History  No LMP recorded. Patient is postmenopausal.   Date of Last Pap: Up to date in accordance with ASCCP guidelines Pap smear schedule reviewed with patient Result history reviewed with patient Pap smear not performed    VITALS  /79 (BP Location: Left arm, Patient Position: Sitting)   Pulse 79   Ht 5' 5" (1.651 m)   Wt 59.9 kg (132 lb)   BMI 21.97 kg/m²   Weight: 59.9 kg (132 lb)   Height: 5' 5" (165.1 cm)     PAST MEDICAL HISTORY  Past Medical History:   Diagnosis Date    Anemia, unspecified     At high risk for breast cancer     BMI 22.0-22.9, adult     Diabetes mellitus     History of migraine headaches     History of UTI     Hormone replacement therapy     Hypercholesterolemia     Hypothyroid     Left ovarian cyst     Menopause     Osteopenia     PMB (postmenopausal bleeding)     HANS (stress urinary incontinence, female)        PAST SURGICAL HISTORY  Past Surgical History:   Procedure Laterality Date    APPENDECTOMY  [id number]    BLEPHAROPLASTY      CECECTOMY  03/2023    COLONOSCOPY  "     COSMETIC SURGERY  9/8/10    Breast reduction    CYST REMOVAL Left     Ring finger    DILATION AND CURETTAGE OF UTERUS      x2    PTERYGIUM Left     REDUCTION OF BOTH BREASTS Bilateral     thumb surgery Left     trigger finger    tonsilectomy      TUBAL LIGATION      tvto sling         SOCIAL HISTORY  Social History     Tobacco Use   Smoking Status Former    Current packs/day: 0.00    Types: Cigarettes    Start date: 1975    Quit date: 1986    Years since quittin.2   Smokeless Tobacco Never   Tobacco Comments    STOP SMOKING AT AGE 30    ,   Social History     Substance and Sexual Activity   Alcohol Use Yes    Alcohol/week: 2.0 standard drinks of alcohol    Types: 2 Drinks containing 0.5 oz of alcohol per week    Comment: OCASSIONAL         MEDICATIONS  Outpatient Medications Marked as Taking for the 10/21/24 encounter (Office Visit) with Sherrie Aleman NP   Medication Sig Dispense Refill    atorvastatin (LIPITOR) 40 MG tablet       blood-glucose meter,continuous (DEXCOM G7  MISC) by Misc.(Non-Drug; Combo Route) route.      calcium-vitamin D 600 mg-10 mcg (400 unit) Tab Take 1 tablet by mouth.      cholestyramine (QUESTRAN) 4 gram packet Take 1 packet (4 g total) by mouth 2 (two) times daily. Avoid taking other medication 2 hours before and 4 hours after taking this medicine. 180 packet 3    clobetasoL (TEMOVATE) 0.05 % cream APPLY TO RASH ON BODY TWICE DAILY FOR 2 WEEKS ON, 2 WEEKS OFF AND REPEAT FOR 2 WEEKS AS NEEDED FOR FLARED. AVOID USE ON FACE UNDERARMS AND GROIN.      dapagliflozin (FARXIGA) 10 mg tablet       EScitalopram oxalate (LEXAPRO) 20 MG tablet Take 1 tablet (20 mg total) by mouth once daily. 90 tablet 3    ferrous sulfate (FEOSOL) 325 mg (65 mg iron) Tab tablet Take 325 mg by mouth once daily.      glimepiride (AMARYL) 2 MG tablet Take 2 mg by mouth 2 (two) times daily.      glimepiride (AMARYL) 4 MG tablet Take 4 mg by mouth 2 (two) times daily.      levothyroxine  (SYNTHROID) 25 MCG tablet Take 25 mcg by mouth.      methocarbamoL (ROBAXIN) 500 MG Tab TAKE 1 /2 (ONE-HALF) TO 1 TABLET BY MOUTH TWICE DAILY AS NEEDED FOR SPASM      MOUNJARO 5 mg/0.5 mL PnIj Inject into the skin.      NOVOLOG FLEXPEN U-100 INSULIN 100 unit/mL (3 mL) InPn pen Inject 10 Units into the skin 3 (three) times daily.      TOUJEO SOLOSTAR U-300 INSULIN 300 unit/mL (1.5 mL) InPn pen INJECT 10 UNITS SUBCUTANEOUSLY ONCE DAILY           Review of Systems   Review of Systems   Constitutional:  Negative for activity change.   Eyes:  Negative for visual disturbance.   Respiratory:  Negative for shortness of breath.    Cardiovascular:  Negative for chest pain.   Gastrointestinal:  Negative for abdominal pain.   Genitourinary:  Negative for vaginal bleeding.        No abnormal vaginal bleeding   Musculoskeletal:  Negative for back pain.   Integumentary:  Negative for rash and breast mass.   Neurological:  Negative for numbness.   Psychiatric/Behavioral:          No mood disturbance or changes    Breast: Negative for mass          Objective:     Physical Exam:   Constitutional: She is oriented to person, place, and time. She appears well-developed. She is cooperative.    HENT:   Head: Normocephalic.     Neck: Trachea normal. No thyromegaly present.    Cardiovascular:  Normal rate, regular rhythm and normal heart sounds.             Pulmonary/Chest: Effort normal and breath sounds normal. Right breast exhibits no mass, no nipple discharge and no skin change. Left breast exhibits no mass, no nipple discharge and no skin change.        Abdominal: Soft. There is no abdominal tenderness. There is no rebound and no guarding.     Genitourinary:    Vagina and uterus normal.      Pelvic exam was performed with patient supine.   The external female genitalia was normal.     Labial bartholins normal.There is no lesion on the right labia. There is no lesion on the left labia. Cervix is normal. Right adnexum displays no mass  and no tenderness. Left adnexum displays no mass and no tenderness. Cervix exhibits no discharge. Uterus is not enlarged and not tender.              Lymphadenopathy:        Head (right side): No submental and no submandibular adenopathy present.        Head (left side): No submental and no submandibular adenopathy present.     She has no cervical adenopathy.    Neurological: She is alert and oriented to person, place, and time.    Skin: Skin is warm.    Psychiatric: She has a normal mood and affect. Her speech is normal and behavior is normal. Thought content normal.          Assessment:     Well woman exam with routine gynecological exam       Plan:     Patient instructed to contact the clinic should any questions or concerns arise prior to her next office visit. Patient is happy with the plan of care at this time, verbalizes understanding and denies outstanding questions.      Mammogram--done [facility]  Self-breast exams  Consider annual health panel with Primary Care if not done  Colonoscopy as indicated  Bone density discussed--ordered with PCP  If you don't hear from the office regarding results within 1 week, please call  Follow up in 1 year for annual or sooner as needed  Chaperone present for exam   Female chaperone present.

## 2025-01-06 NOTE — PROGRESS NOTES
Subjective:       Patient ID: Luzmaria Grossman is a 64 y.o. female.    Chief Complaint:  Well Woman ( NEG PAP, RADHA, COLONOSCOPY: 10/2017)      History of Present Illness  She is doing well.  No new health issues,  No abnormal bleeding, No GI or Gu concerns,  No dyspareunia.   She is on her HRT   She found it will be 300      No bleeding or spotting   HPI      GYN & OB History  No LMP recorded. Patient is postmenopausal.     Date of Last Pap: No result found    OB History    Para Term  AB Living   4 2           SAB TAB Ectopic Multiple Live Births                  # Outcome Date GA Lbr Eric/2nd Weight Sex Delivery Anes PTL Lv   4             3             2 Para            1 Para                Review of Systems  Review of Systems          Objective:    Physical Exam       Assessment:        1. Well woman exam    2. Breast cancer screening by mammogram               Plan:             Pap   Mammogram  Follow up one year or sooner if needed  Self breast exams  Consider health profile if labs not done with PCP  Bone density discussed   Pt is aware we call all results. If she does not hear from our office regarding her result within a week of having a study or procedure performed she is to call the office so that we can research the result for her as I do not know when she is scheduling her procedures.   Chaperone was present      (M6) obeys commands

## 2025-03-24 ENCOUNTER — TELEPHONE (OUTPATIENT)
Dept: GASTROENTEROLOGY | Facility: CLINIC | Age: 70
End: 2025-03-24
Payer: MEDICARE

## 2025-03-24 NOTE — TELEPHONE ENCOUNTER
Copied from CRM #[id number]. Topic: Appointments - Information Request  >> Mar 24, 2025 10:30 AM Hannah wrote:  Type:  Needs Medical Advice    Who Called: pt  Symptoms (please be specific): na   How long has patient had these symptoms:  na  Pharmacy name and phone #:  na  Would the patient rather a call back or a response via [facility]? call  Best Call Back Number: [phone]  Additional Information: requesting to speak with office as she received a letter in the mail for a repeat of colonoscopy but she was unaware that she needed this as Dr Strong advised her she wouldn't need one until 2033

## 2025-03-24 NOTE — TELEPHONE ENCOUNTER
Spoke with patient and per patient recall at last visit with Dr. Strong and after reviewed her notes, patient is not due until 2032.

## 2025-07-23 RX ORDER — CHOLESTYRAMINE 4 G/9G
POWDER, FOR SUSPENSION ORAL
Qty: 60 PACKET | Refills: 6 | Status: SHIPPED | OUTPATIENT
Start: 2025-07-23

## 2025-08-11 ENCOUNTER — TELEPHONE (OUTPATIENT)
Dept: OBSTETRICS AND GYNECOLOGY | Facility: CLINIC | Age: 70
End: 2025-08-11
Payer: MEDICARE

## 2025-08-12 DIAGNOSIS — N95.0 POST-MENOPAUSAL BLEEDING: Primary | ICD-10-CM

## 2025-08-13 ENCOUNTER — OFFICE VISIT (OUTPATIENT)
Dept: GASTROENTEROLOGY | Facility: CLINIC | Age: 70
End: 2025-08-13
Payer: MEDICARE

## 2025-08-13 VITALS
HEART RATE: 65 BPM | BODY MASS INDEX: 22.26 KG/M2 | SYSTOLIC BLOOD PRESSURE: 103 MMHG | OXYGEN SATURATION: 98 % | WEIGHT: 133.63 LBS | DIASTOLIC BLOOD PRESSURE: 64 MMHG | HEIGHT: 65 IN

## 2025-08-13 DIAGNOSIS — M62.89 PELVIC FLOOR DYSFUNCTION: ICD-10-CM

## 2025-08-13 DIAGNOSIS — R74.8 ELEVATED LIVER ENZYMES: ICD-10-CM

## 2025-08-13 DIAGNOSIS — R15.9 INCONTINENCE OF FECES, UNSPECIFIED FECAL INCONTINENCE TYPE: Primary | ICD-10-CM

## 2025-08-13 DIAGNOSIS — K75.81 NASH (NONALCOHOLIC STEATOHEPATITIS): ICD-10-CM

## 2025-08-13 PROCEDURE — 99213 OFFICE O/P EST LOW 20 MIN: CPT | Mod: S$PBB,,, | Performed by: NURSE PRACTITIONER

## 2025-08-13 RX ORDER — PEN NEEDLE, DIABETIC 32GX 5/32"
NEEDLE, DISPOSABLE MISCELLANEOUS
COMMUNITY
Start: 2025-03-14

## 2025-08-14 ENCOUNTER — PROCEDURE VISIT (OUTPATIENT)
Dept: OBSTETRICS AND GYNECOLOGY | Facility: CLINIC | Age: 70
End: 2025-08-14
Payer: MEDICARE

## 2025-08-14 ENCOUNTER — TELEPHONE (OUTPATIENT)
Dept: GASTROENTEROLOGY | Facility: CLINIC | Age: 70
End: 2025-08-14
Payer: MEDICARE

## 2025-08-14 DIAGNOSIS — R15.9 INCONTINENCE OF FECES, UNSPECIFIED FECAL INCONTINENCE TYPE: Primary | ICD-10-CM

## 2025-08-14 DIAGNOSIS — N95.0 POST-MENOPAUSAL BLEEDING: ICD-10-CM

## 2025-08-14 PROCEDURE — 76830 TRANSVAGINAL US NON-OB: CPT | Mod: 26,S$PBB,, | Performed by: OBSTETRICS & GYNECOLOGY

## 2025-08-15 RX ORDER — LEVOTHYROXINE SODIUM 50 UG/1
50 TABLET ORAL
COMMUNITY

## 2025-08-15 RX ORDER — DICYCLOMINE HYDROCHLORIDE 20 MG/1
20 TABLET ORAL EVERY 6 HOURS
COMMUNITY

## 2025-08-23 ENCOUNTER — PATIENT MESSAGE (OUTPATIENT)
Dept: RESEARCH | Facility: HOSPITAL | Age: 70
End: 2025-08-23
Payer: MEDICARE